# Patient Record
Sex: FEMALE | Race: OTHER | Employment: FULL TIME | URBAN - METROPOLITAN AREA
[De-identification: names, ages, dates, MRNs, and addresses within clinical notes are randomized per-mention and may not be internally consistent; named-entity substitution may affect disease eponyms.]

---

## 2017-02-07 NOTE — MR AVS SNAPSHOT
Forms for [employer] redone and then refaxed as requested.  Copy to scanning.   [facility] [place] [address] [address] [place] [id number] [phone]               Thank you for choosing us for your health care visit with Gallo Atkins MD.  We are glad to serve you and happy to provide you with this summary of y

## 2017-03-08 ENCOUNTER — OFFICE VISIT (OUTPATIENT)
Dept: OBGYN CLINIC | Facility: CLINIC | Age: 37
End: 2017-03-08

## 2017-03-08 VITALS
SYSTOLIC BLOOD PRESSURE: 105 MMHG | BODY MASS INDEX: 20.14 KG/M2 | HEIGHT: 64.25 IN | WEIGHT: 118 LBS | DIASTOLIC BLOOD PRESSURE: 70 MMHG | HEART RATE: 71 BPM

## 2017-03-08 DIAGNOSIS — Z01.419 WELL WOMAN EXAM WITH ROUTINE GYNECOLOGICAL EXAM: Primary | ICD-10-CM

## 2017-03-08 PROCEDURE — 99395 PREV VISIT EST AGE 18-39: CPT | Performed by: CLINICAL NURSE SPECIALIST

## 2017-03-09 NOTE — PROGRESS NOTES
Nicol Donaldson is a 39year old female  No LMP recorded. Patient is not currently having periods (Reason: IUD - Intrauterine Device). Patient presents with:  Gyn Exam: annual exam   Last annual exam and pap was 3/1/16. Pap was normal, HPV negative. Hypertension Mother    • Cancer Paternal Uncle      Colon cancer cause of death       MEDICATIONS:    Current outpatient prescriptions:   •  Cholecalciferol (VITAMIN D) 1000 UNITS Oral Tab, Take by mouth., Disp: , Rfl:     ALLERGIES:  No Known Allergies without tenderness  Adnexa: normal without masses or tenderness  Perineum: normal  Anus: small hemorroid     Assessment & Plan:  Enoch Vidales was seen today for gyn exam.    Diagnoses and all orders for this visit:    Well woman exam with routine gynecological

## 2017-05-11 ENCOUNTER — OFFICE VISIT (OUTPATIENT)
Dept: FAMILY MEDICINE CLINIC | Facility: CLINIC | Age: 37
End: 2017-05-11

## 2017-05-11 VITALS
HEIGHT: 65 IN | DIASTOLIC BLOOD PRESSURE: 81 MMHG | TEMPERATURE: 98 F | HEART RATE: 118 BPM | SYSTOLIC BLOOD PRESSURE: 116 MMHG | OXYGEN SATURATION: 96 % | BODY MASS INDEX: 19.49 KG/M2 | RESPIRATION RATE: 22 BRPM | WEIGHT: 117 LBS

## 2017-05-11 DIAGNOSIS — K52.9 GASTROENTERITIS: ICD-10-CM

## 2017-05-11 PROCEDURE — 99213 OFFICE O/P EST LOW 20 MIN: CPT | Performed by: FAMILY MEDICINE

## 2017-05-11 PROCEDURE — 99212 OFFICE O/P EST SF 10 MIN: CPT | Performed by: FAMILY MEDICINE

## 2017-05-11 RX ORDER — ONDANSETRON 4 MG/1
4 TABLET, FILM COATED ORAL EVERY 8 HOURS PRN
Qty: 20 TABLET | Refills: 0 | Status: SHIPPED | OUTPATIENT
Start: 2017-05-11 | End: 2017-05-18

## 2017-05-11 NOTE — PROGRESS NOTES
HPI:    Patient ID: Tabatha Canas is a 39year old female. HPI Comments: Pt has had vomiting/ diarrhea symptoms since last night. Has felt chills and feverish and some stomach aches. No suspicious ingestions or known sick contacts.  has had C.  D and no guarding. Lymphadenopathy:     She has no cervical adenopathy. ASSESSMENT/PLAN:   Gastroenteritis:  - After discussion, bland diet discussed; adequate fluids; zofran as needed for nausea; To call if worse or not better;  Follow up in 2

## 2017-12-05 ENCOUNTER — OFFICE VISIT (OUTPATIENT)
Dept: FAMILY MEDICINE CLINIC | Facility: CLINIC | Age: 37
End: 2017-12-05

## 2017-12-05 VITALS
HEART RATE: 67 BPM | BODY MASS INDEX: 21 KG/M2 | WEIGHT: 123.5 LBS | SYSTOLIC BLOOD PRESSURE: 115 MMHG | DIASTOLIC BLOOD PRESSURE: 78 MMHG

## 2017-12-05 DIAGNOSIS — R55 SYNCOPE, UNSPECIFIED SYNCOPE TYPE: Primary | ICD-10-CM

## 2017-12-05 DIAGNOSIS — R42 VERTIGO: ICD-10-CM

## 2017-12-05 PROCEDURE — 99214 OFFICE O/P EST MOD 30 MIN: CPT | Performed by: FAMILY MEDICINE

## 2017-12-05 PROCEDURE — 99212 OFFICE O/P EST SF 10 MIN: CPT | Performed by: FAMILY MEDICINE

## 2017-12-06 NOTE — PROGRESS NOTES
HPI:    Patient ID: Drake Jarrell is a 44year old female.     HPI  Patient presents with:  Fainting: pt fainted two weeks ago, since fainting episode she has been having dizziness off and on.  occurred at home, at night getting up from bed but minutes aft

## 2017-12-09 ENCOUNTER — APPOINTMENT (OUTPATIENT)
Dept: LAB | Facility: HOSPITAL | Age: 37
End: 2017-12-09
Attending: FAMILY MEDICINE
Payer: COMMERCIAL

## 2017-12-09 DIAGNOSIS — R55 SYNCOPE, UNSPECIFIED SYNCOPE TYPE: ICD-10-CM

## 2017-12-09 PROCEDURE — 82306 VITAMIN D 25 HYDROXY: CPT

## 2017-12-09 PROCEDURE — 36415 COLL VENOUS BLD VENIPUNCTURE: CPT

## 2017-12-09 PROCEDURE — 80048 BASIC METABOLIC PNL TOTAL CA: CPT

## 2017-12-09 PROCEDURE — 93010 ELECTROCARDIOGRAM REPORT: CPT | Performed by: FAMILY MEDICINE

## 2017-12-09 PROCEDURE — 85025 COMPLETE CBC W/AUTO DIFF WBC: CPT

## 2017-12-09 PROCEDURE — 93005 ELECTROCARDIOGRAM TRACING: CPT

## 2017-12-09 PROCEDURE — 84443 ASSAY THYROID STIM HORMONE: CPT

## 2017-12-12 ENCOUNTER — TELEPHONE (OUTPATIENT)
Dept: FAMILY MEDICINE CLINIC | Facility: CLINIC | Age: 37
End: 2017-12-12

## 2017-12-14 NOTE — TELEPHONE ENCOUNTER
Spoke with patient (identified name and ) ,results reviewed and agrees with  plan. Note      Normal ECG        Note      Very low vitamin D level. Should start otc vitamin D3 2000 IU daily. Take for at least 3 monhts.

## 2018-03-20 ENCOUNTER — OFFICE VISIT (OUTPATIENT)
Dept: OBGYN CLINIC | Facility: CLINIC | Age: 38
End: 2018-03-20

## 2018-03-20 VITALS
SYSTOLIC BLOOD PRESSURE: 116 MMHG | BODY MASS INDEX: 21 KG/M2 | HEART RATE: 69 BPM | WEIGHT: 123.5 LBS | DIASTOLIC BLOOD PRESSURE: 80 MMHG

## 2018-03-20 DIAGNOSIS — Z01.419 ENCOUNTER FOR GYNECOLOGICAL EXAMINATION: Primary | ICD-10-CM

## 2018-03-20 PROCEDURE — 99395 PREV VISIT EST AGE 18-39: CPT | Performed by: OBSTETRICS & GYNECOLOGY

## 2018-03-21 NOTE — PROGRESS NOTES
Chi Bhat is a 44year old female  No LMP recorded. Patient is not currently having periods (Reason: IUD - Intrauterine Device). here for annual exam.       Last seen 3/1/16. Last pap 3/2016 normal with neg HPV.       Had Mirena IUD placed 10/2 constipation  Genitourinary:  denies dysuria, incontinence, abnormal vaginal discharge, vaginal itching  Musculoskeletal:  denies back pain. Skin/Breast:  Denies any breast pain, lumps, or discharge.    Neurological:  denies headaches, extremity weakness o

## 2018-03-27 ENCOUNTER — TELEPHONE (OUTPATIENT)
Dept: FAMILY MEDICINE CLINIC | Facility: CLINIC | Age: 38
End: 2018-03-27

## 2018-03-27 DIAGNOSIS — Z00.00 ROUTINE GENERAL MEDICAL EXAMINATION AT A HEALTH CARE FACILITY: Primary | ICD-10-CM

## 2018-03-27 NOTE — TELEPHONE ENCOUNTER
Patient is calling to request general lab orders (will be setting up a px appointment for soon) and would also like to order a lab order for vitamin d (retest) please advise.

## 2018-03-30 ENCOUNTER — LAB ENCOUNTER (OUTPATIENT)
Dept: LAB | Facility: HOSPITAL | Age: 38
End: 2018-03-30
Attending: FAMILY MEDICINE
Payer: COMMERCIAL

## 2018-03-30 DIAGNOSIS — Z00.00 ROUTINE GENERAL MEDICAL EXAMINATION AT A HEALTH CARE FACILITY: ICD-10-CM

## 2018-03-30 PROCEDURE — 36415 COLL VENOUS BLD VENIPUNCTURE: CPT

## 2018-03-30 PROCEDURE — 82306 VITAMIN D 25 HYDROXY: CPT

## 2018-03-30 PROCEDURE — 80053 COMPREHEN METABOLIC PANEL: CPT

## 2018-03-30 PROCEDURE — 85025 COMPLETE CBC W/AUTO DIFF WBC: CPT

## 2018-03-30 PROCEDURE — 80061 LIPID PANEL: CPT

## 2018-04-17 ENCOUNTER — OFFICE VISIT (OUTPATIENT)
Dept: FAMILY MEDICINE CLINIC | Facility: CLINIC | Age: 38
End: 2018-04-17

## 2018-04-17 VITALS
BODY MASS INDEX: 20.66 KG/M2 | TEMPERATURE: 97 F | HEART RATE: 71 BPM | SYSTOLIC BLOOD PRESSURE: 107 MMHG | HEIGHT: 65 IN | DIASTOLIC BLOOD PRESSURE: 73 MMHG | WEIGHT: 124 LBS

## 2018-04-17 DIAGNOSIS — Z00.00 ROUTINE GENERAL MEDICAL EXAMINATION AT A HEALTH CARE FACILITY: Primary | ICD-10-CM

## 2018-04-17 DIAGNOSIS — M99.03 LUMBAR REGION SOMATIC DYSFUNCTION: ICD-10-CM

## 2018-04-17 DIAGNOSIS — K58.9 IRRITABLE BOWEL SYNDROME WITHOUT DIARRHEA: ICD-10-CM

## 2018-04-17 PROCEDURE — 99395 PREV VISIT EST AGE 18-39: CPT | Performed by: FAMILY MEDICINE

## 2018-04-17 RX ORDER — CHOLECALCIFEROL (VITAMIN D3) 50 MCG
TABLET ORAL
COMMUNITY

## 2018-04-18 NOTE — PROGRESS NOTES
HPI:    Patient ID: Kem Seaman is a 44year old female.     HPI  Patient presents with:  Routine Physical: annual physical, results of lab work, concerns with stomach issues, back pain     Past Medical History:   Diagnosis Date   • Abnormal Pap smear 20 normal and breath sounds normal.   Abdominal: There is no splenomegaly or hepatomegaly. There is no tenderness. There is no CVA tenderness. Musculoskeletal:        Lumbar back: Normal.   Lymphadenopathy:     She has no cervical adenopathy.    Neurological

## 2019-04-02 ENCOUNTER — OFFICE VISIT (OUTPATIENT)
Dept: OBGYN CLINIC | Facility: CLINIC | Age: 39
End: 2019-04-02
Payer: COMMERCIAL

## 2019-04-02 VITALS
BODY MASS INDEX: 21.17 KG/M2 | DIASTOLIC BLOOD PRESSURE: 72 MMHG | WEIGHT: 124 LBS | SYSTOLIC BLOOD PRESSURE: 110 MMHG | HEART RATE: 73 BPM | HEIGHT: 64.25 IN

## 2019-04-02 DIAGNOSIS — Z12.31 ENCOUNTER FOR SCREENING MAMMOGRAM FOR BREAST CANCER: ICD-10-CM

## 2019-04-02 DIAGNOSIS — Z01.419 ENCOUNTER FOR GYNECOLOGICAL EXAMINATION: Primary | ICD-10-CM

## 2019-04-02 DIAGNOSIS — Z12.4 SCREENING FOR MALIGNANT NEOPLASM OF CERVIX: ICD-10-CM

## 2019-04-02 PROCEDURE — 99395 PREV VISIT EST AGE 18-39: CPT | Performed by: OBSTETRICS & GYNECOLOGY

## 2019-04-03 NOTE — PROGRESS NOTES
Debbie Esparza is a 43year old female  No LMP recorded. Patient is not currently having periods (Reason: IUD - Intrauterine Device). here for annual exam.       Last seen 3/20/18. Last pap 3/2016 normal with neg HPV.     Had [address] IUD placed 10/20 pain or palpitations  Respiratory:  denies shortness of breath  Gastrointestinal:  denies heartburn, abdominal pain, diarrhea or constipation  Genitourinary:  denies dysuria, incontinence, abnormal vaginal discharge, vaginal itching  Musculoskeletal:  presley on Nexplanon. Will RTC 10/2019 for MIrena IUD removal.   RTC 1 year or prn    2. Encounter for screening mammogram for breast cancer   GARRETT TATA 2D+3D SCREENING BILAT (CPT=77067/85531);  Future        Requested Prescriptions      No prescriptions requested

## 2019-04-27 ENCOUNTER — NURSE TRIAGE (OUTPATIENT)
Dept: OTHER | Age: 39
End: 2019-04-27

## 2019-04-27 ENCOUNTER — OFFICE VISIT (OUTPATIENT)
Dept: FAMILY MEDICINE CLINIC | Facility: CLINIC | Age: 39
End: 2019-04-27
Payer: COMMERCIAL

## 2019-04-27 VITALS
HEART RATE: 99 BPM | TEMPERATURE: 99 F | SYSTOLIC BLOOD PRESSURE: 105 MMHG | DIASTOLIC BLOOD PRESSURE: 73 MMHG | BODY MASS INDEX: 21 KG/M2 | HEIGHT: 64.25 IN | WEIGHT: 123 LBS

## 2019-04-27 DIAGNOSIS — J01.00 ACUTE MAXILLARY SINUSITIS, RECURRENCE NOT SPECIFIED: Primary | ICD-10-CM

## 2019-04-27 PROCEDURE — 99213 OFFICE O/P EST LOW 20 MIN: CPT | Performed by: FAMILY MEDICINE

## 2019-04-27 PROCEDURE — 99212 OFFICE O/P EST SF 10 MIN: CPT | Performed by: FAMILY MEDICINE

## 2019-04-27 RX ORDER — AZITHROMYCIN 250 MG/1
TABLET, FILM COATED ORAL
Qty: 6 TABLET | Refills: 0 | Status: SHIPPED | OUTPATIENT
Start: 2019-04-27 | End: 2020-02-04 | Stop reason: ALTCHOICE

## 2019-04-27 NOTE — TELEPHONE ENCOUNTER
Patient called back, advised that Dr Shivani George not responded yet, patient asking to see different provider now, while checking   The schedule,found an open OV with Dr Shivani George and [address] made.        Future Appointments   Date Time Provider Drew Blandon   4/2

## 2019-04-27 NOTE — TELEPHONE ENCOUNTER
Please reply to pool: EM RN TRIAGE  Action Requested: Summary for Provider     []  Critical Lab, Recommendations Needed  [x] Need Additional Advice  []   FYI    [x]   Need Orders  [] Need Medications Sent to Pharmacy  []  Other     SUMMARY: Declines to see

## 2019-04-27 NOTE — PROGRESS NOTES
HPI:    Patient ID: Debbie Esparza is a 43year old female.     HPI  Patient presents with:  Headache: with pressure for 3 days, watery eyes,  runny nose, congestion  Fever: of 101, took tylonon at 10 pm yesterday   Back Pain: pain with certain movements, l

## 2019-05-05 ENCOUNTER — HOSPITAL ENCOUNTER (OUTPATIENT)
Dept: MAMMOGRAPHY | Facility: HOSPITAL | Age: 39
Discharge: HOME | End: 2019-05-05
Attending: OBSTETRICS & GYNECOLOGY
Payer: COMMERCIAL

## 2019-05-05 DIAGNOSIS — Z12.31 ENCOUNTER FOR SCREENING MAMMOGRAM FOR BREAST CANCER: ICD-10-CM

## 2019-05-05 PROCEDURE — 77063 BREAST TOMOSYNTHESIS BI: CPT | Performed by: OBSTETRICS & GYNECOLOGY

## 2019-05-05 PROCEDURE — 77067 SCR MAMMO BI INCL CAD: CPT | Performed by: OBSTETRICS & GYNECOLOGY

## 2019-07-31 ENCOUNTER — TELEPHONE (OUTPATIENT)
Dept: OBGYN CLINIC | Facility: CLINIC | Age: 39
End: 2019-07-31

## 2019-07-31 NOTE — TELEPHONE ENCOUNTER
PT REQUESTING APPT AFTER 5PM OR SAT. BOOKED APPT FOR 9-14-19 FOR MIRENA IUD REMOVAL AND RE-INSERTION PER PT REQUEST.

## 2019-09-14 ENCOUNTER — OFFICE VISIT (OUTPATIENT)
Dept: OBGYN CLINIC | Facility: CLINIC | Age: 39
End: 2019-09-14
Payer: COMMERCIAL

## 2019-09-14 VITALS
HEART RATE: 76 BPM | WEIGHT: 121.63 LBS | BODY MASS INDEX: 21 KG/M2 | DIASTOLIC BLOOD PRESSURE: 60 MMHG | SYSTOLIC BLOOD PRESSURE: 96 MMHG

## 2019-09-14 DIAGNOSIS — Z30.433 ENCOUNTER FOR REMOVAL AND REINSERTION OF INTRAUTERINE CONTRACEPTIVE DEVICE: Primary | ICD-10-CM

## 2019-09-14 PROCEDURE — 58300 INSERT INTRAUTERINE DEVICE: CPT | Performed by: OBSTETRICS & GYNECOLOGY

## 2019-09-14 PROCEDURE — 58301 REMOVE INTRAUTERINE DEVICE: CPT | Performed by: OBSTETRICS & GYNECOLOGY

## 2019-09-14 NOTE — PROCEDURES
IUD Removal     Consent signed. Procedure discussed with the patient in detail including indication, risks, benefits, alternatives and complications.     Pelvic Exam Findings:  Lesion description:  IUD strings seen from cervix    Procedure:  Speculum fe

## 2019-10-19 ENCOUNTER — OFFICE VISIT (OUTPATIENT)
Dept: OBGYN CLINIC | Facility: CLINIC | Age: 39
End: 2019-10-19
Payer: COMMERCIAL

## 2019-10-19 VITALS
HEART RATE: 91 BPM | BODY MASS INDEX: 21 KG/M2 | DIASTOLIC BLOOD PRESSURE: 69 MMHG | SYSTOLIC BLOOD PRESSURE: 101 MMHG | WEIGHT: 125.38 LBS

## 2019-10-19 DIAGNOSIS — Z30.431 IUD CHECK UP: Primary | ICD-10-CM

## 2019-10-19 PROCEDURE — 99212 OFFICE O/P EST SF 10 MIN: CPT | Performed by: OBSTETRICS & GYNECOLOGY

## 2019-10-19 NOTE — PROGRESS NOTES
Yvonne Billings is a 35year old female  No LMP recorded. (Menstrual status: IUD - Intrauterine Device). Patient presents with: Follow - Up:  Mirena IUD check     Here for IUD check. Had new Mirena IUD placed on 19.   Having irregular spotting Exam:  External Genitalia: normal appearance, hair distribution, and no lesions  Urethral Meatus:  normal in size, location, without lesions and prolapse  Bladder:  No fullness, masses or tenderness  Vagina:  Normal appearance without lesions, no abnormal

## 2019-11-30 ENCOUNTER — TELEPHONE (OUTPATIENT)
Dept: FAMILY MEDICINE CLINIC | Facility: CLINIC | Age: 39
End: 2019-11-30

## 2019-11-30 NOTE — TELEPHONE ENCOUNTER
Patient is requesting to speak with dr. Miller Manzo. She is wanting to see a dermatologists, and would like to know who he recommends.

## 2019-12-03 NOTE — TELEPHONE ENCOUNTER
Called spoke with  Pt in regards to information for derm, gave number and name of drs make appts with

## 2020-02-04 ENCOUNTER — OFFICE VISIT (OUTPATIENT)
Dept: FAMILY MEDICINE CLINIC | Facility: CLINIC | Age: 40
End: 2020-02-04
Payer: COMMERCIAL

## 2020-02-04 VITALS
TEMPERATURE: 98 F | WEIGHT: 127 LBS | BODY MASS INDEX: 21.68 KG/M2 | HEIGHT: 64.25 IN | DIASTOLIC BLOOD PRESSURE: 68 MMHG | HEART RATE: 75 BPM | SYSTOLIC BLOOD PRESSURE: 111 MMHG

## 2020-02-04 DIAGNOSIS — K58.9 IRRITABLE BOWEL SYNDROME WITHOUT DIARRHEA: ICD-10-CM

## 2020-02-04 DIAGNOSIS — Z00.00 ROUTINE GENERAL MEDICAL EXAMINATION AT A HEALTH CARE FACILITY: Primary | ICD-10-CM

## 2020-02-04 DIAGNOSIS — L60.3 NAIL DYSTROPHY: ICD-10-CM

## 2020-02-04 PROCEDURE — 99396 PREV VISIT EST AGE 40-64: CPT | Performed by: FAMILY MEDICINE

## 2020-02-04 NOTE — PROGRESS NOTES
HPI:    Patient ID: Jai Valdez is a 35year old female. HPI  Patient presents with:  Physical: annual physical (NEW INSURANCE)  IBS  Nail changes foot    Review of Systems   Constitutional: Negative. HENT: Negative. Eyes: Negative.     Respirat Lymphadenopathy:     She has no cervical adenopathy. Neurological: She is alert and oriented to person, place, and time. She has normal strength and normal reflexes. No sensory deficit.    Skin:   No suspicious lesions above the waist exam   Psychiatr

## 2020-02-09 ENCOUNTER — LAB ENCOUNTER (OUTPATIENT)
Dept: LAB | Facility: HOSPITAL | Age: 40
End: 2020-02-09
Attending: FAMILY MEDICINE
Payer: COMMERCIAL

## 2020-02-09 DIAGNOSIS — Z00.00 ROUTINE GENERAL MEDICAL EXAMINATION AT A HEALTH CARE FACILITY: ICD-10-CM

## 2020-02-09 LAB
ALBUMIN SERPL-MCNC: 4 G/DL (ref 3.4–5)
ALBUMIN/GLOB SERPL: 1.2 {RATIO} (ref 1–2)
ALP LIVER SERPL-CCNC: 65 U/L (ref 37–98)
ALT SERPL-CCNC: 16 U/L (ref 13–56)
ANION GAP SERPL CALC-SCNC: 4 MMOL/L (ref 0–18)
AST SERPL-CCNC: 12 U/L (ref 15–37)
BASOPHILS # BLD AUTO: 0.03 X10(3) UL (ref 0–0.2)
BASOPHILS NFR BLD AUTO: 0.7 %
BILIRUB SERPL-MCNC: 0.7 MG/DL (ref 0.1–2)
BUN BLD-MCNC: 16 MG/DL (ref 7–18)
BUN/CREAT SERPL: 20.5 (ref 10–20)
CALCIUM BLD-MCNC: 8.6 MG/DL (ref 8.5–10.1)
CHLORIDE SERPL-SCNC: 111 MMOL/L (ref 98–112)
CHOLEST SMN-MCNC: 126 MG/DL (ref ?–200)
CO2 SERPL-SCNC: 28 MMOL/L (ref 21–32)
CREAT BLD-MCNC: 0.78 MG/DL (ref 0.55–1.02)
DEPRECATED RDW RBC AUTO: 42.8 FL (ref 35.1–46.3)
EOSINOPHIL # BLD AUTO: 0.11 X10(3) UL (ref 0–0.7)
EOSINOPHIL NFR BLD AUTO: 2.7 %
ERYTHROCYTE [DISTWIDTH] IN BLOOD BY AUTOMATED COUNT: 12.4 % (ref 11–15)
GLOBULIN PLAS-MCNC: 3.3 G/DL (ref 2.8–4.4)
GLUCOSE BLD-MCNC: 75 MG/DL (ref 70–99)
HCT VFR BLD AUTO: 41 % (ref 35–48)
HDLC SERPL-MCNC: 57 MG/DL (ref 40–59)
HGB BLD-MCNC: 13.4 G/DL (ref 12–16)
IMM GRANULOCYTES # BLD AUTO: 0 X10(3) UL (ref 0–1)
IMM GRANULOCYTES NFR BLD: 0 %
LDLC SERPL CALC-MCNC: 54 MG/DL (ref ?–100)
LYMPHOCYTES # BLD AUTO: 1.43 X10(3) UL (ref 1–4)
LYMPHOCYTES NFR BLD AUTO: 34.5 %
M PROTEIN MFR SERPL ELPH: 7.3 G/DL (ref 6.4–8.2)
MCH RBC QN AUTO: 30.5 PG (ref 26–34)
MCHC RBC AUTO-ENTMCNC: 32.7 G/DL (ref 31–37)
MCV RBC AUTO: 93.4 FL (ref 80–100)
MONOCYTES # BLD AUTO: 0.3 X10(3) UL (ref 0.1–1)
MONOCYTES NFR BLD AUTO: 7.2 %
NEUTROPHILS # BLD AUTO: 2.27 X10 (3) UL (ref 1.5–7.7)
NEUTROPHILS # BLD AUTO: 2.27 X10(3) UL (ref 1.5–7.7)
NEUTROPHILS NFR BLD AUTO: 54.9 %
NONHDLC SERPL-MCNC: 69 MG/DL (ref ?–130)
OSMOLALITY SERPL CALC.SUM OF ELEC: 296 MOSM/KG (ref 275–295)
PATIENT FASTING Y/N/NP: YES
PATIENT FASTING Y/N/NP: YES
PLATELET # BLD AUTO: 234 10(3)UL (ref 150–450)
POTASSIUM SERPL-SCNC: 3.9 MMOL/L (ref 3.5–5.1)
RBC # BLD AUTO: 4.39 X10(6)UL (ref 3.8–5.3)
SODIUM SERPL-SCNC: 143 MMOL/L (ref 136–145)
TRIGL SERPL-MCNC: 76 MG/DL (ref 30–149)
VLDLC SERPL CALC-MCNC: 15 MG/DL (ref 0–30)
WBC # BLD AUTO: 4.1 X10(3) UL (ref 4–11)

## 2020-02-09 PROCEDURE — 36415 COLL VENOUS BLD VENIPUNCTURE: CPT

## 2020-02-09 PROCEDURE — 82306 VITAMIN D 25 HYDROXY: CPT

## 2020-02-09 PROCEDURE — 80061 LIPID PANEL: CPT

## 2020-02-09 PROCEDURE — 85025 COMPLETE CBC W/AUTO DIFF WBC: CPT

## 2020-02-09 PROCEDURE — 80053 COMPREHEN METABOLIC PANEL: CPT

## 2020-02-10 LAB — 25(OH)D3 SERPL-MCNC: 36.7 NG/ML (ref 30–100)

## 2020-02-25 ENCOUNTER — TELEPHONE (OUTPATIENT)
Dept: FAMILY MEDICINE CLINIC | Facility: CLINIC | Age: 40
End: 2020-02-25

## 2020-02-25 NOTE — TELEPHONE ENCOUNTER
Result Notes for CBC W/ DIFFERENTIAL     Notes recorded by Enoch Light DO on 2/24/2020 at 8:45 AM CST  Please call patient and inform that the laboratory results are acceptable range. The abnormalities are minor and not significant at this time.  lorena

## 2020-04-09 ENCOUNTER — TELEPHONE (OUTPATIENT)
Dept: FAMILY MEDICINE CLINIC | Facility: CLINIC | Age: 40
End: 2020-04-09

## 2020-04-09 NOTE — TELEPHONE ENCOUNTER
Jackelin from [address] [facility] would like the diagnosis on patient on why pt needed Vitamin D faxed to # [phone]. This order was on 2/09.  Please advise

## 2020-04-09 NOTE — TELEPHONE ENCOUNTER
Pt reported history of vitamin D insufficiency and takes supplement. and also she has IBS therefore it was ordered.

## 2020-04-10 ENCOUNTER — NURSE TRIAGE (OUTPATIENT)
Dept: OTHER | Age: 40
End: 2020-04-10

## 2020-04-10 DIAGNOSIS — H02.60 XANTHELASMA: Primary | ICD-10-CM

## 2020-04-10 PROCEDURE — 99213 OFFICE O/P EST LOW 20 MIN: CPT | Performed by: FAMILY MEDICINE

## 2020-04-10 NOTE — TELEPHONE ENCOUNTER
Action Requested: Summary for Provider     []  Critical Lab, Recommendations Needed  [x] Need Additional Advice  []   FYI    []   Need Orders  [] Need Medications Sent to Pharmacy  []  Other     SUMMARY: Patient states she that for past month or so  has a

## 2020-04-10 NOTE — TELEPHONE ENCOUNTER
Virtual Telephone Check-In    Yvonne Billings verbally consents to a Virtual/Telephone Check-In visit on 04/10/20. Patient understands and accepts financial responsibility for any deductible, co-insurance and/or co-pays associated with this service.     D

## 2020-04-24 ENCOUNTER — TELEPHONE (OUTPATIENT)
Dept: FAMILY MEDICINE CLINIC | Facility: CLINIC | Age: 40
End: 2020-04-24

## 2020-04-24 DIAGNOSIS — E55.9 VITAMIN D DEFICIENCY: Primary | ICD-10-CM

## 2020-04-24 NOTE — TELEPHONE ENCOUNTER
Mariama with [facility] & Patient called to advise Labs (blood work) done 02/092020 was coded/submitted/billed incorrectly. She did do routine labs, but One of the tests was entered in as Diagnosis/Routine: The Vitamin D Test is supposed to reflect it's for a Vitamin D Deficiency. This NEEDS to be resubmitted reflecting that so it is billed accordingly. Patient is very upset as this is not the first call about this (see communication from 4/9)    Please Advise ASAP.  Order needs to be changed/revised the claim for that test with the proper coding    [employer] Phone Call reference #: Henny Martin Number: [phone] (follow prompts for provider)    Patient is requesting a call from PCP once this is done and the claim is corrected

## 2020-04-27 NOTE — TELEPHONE ENCOUNTER
Clinical staff, please assist.   1. Print out lab order for Vitamin D 02/09/2020.     2. Dr. Maikel Pollock will need to cross out the diagnosis and write correct diagnosis, sign and date order. 3. Clinical staff please fax order with Cover sheet.  Attention Coding team [phone]

## 2020-05-05 ENCOUNTER — TELEPHONE (OUTPATIENT)
Dept: FAMILY MEDICINE CLINIC | Facility: CLINIC | Age: 40
End: 2020-05-05

## 2020-05-05 ENCOUNTER — MED REC SCAN ONLY (OUTPATIENT)
Dept: FAMILY MEDICINE CLINIC | Facility: CLINIC | Age: 40
End: 2020-05-05

## 2020-05-05 NOTE — TELEPHONE ENCOUNTER
Called pt to inform that corrected lab order was faxed to billing department. Fax # [phone]. Dr Geanie Cooks reviewed and fixed the billing codes for Vit D deficiency.

## 2020-05-05 NOTE — TELEPHONE ENCOUNTER
Corrected lab order verified and changed by Dr Arabella Foster faxed succesfully to billing department. Fax # [phone]. Confirmation number placed in scanning.

## 2020-06-01 ENCOUNTER — OFFICE VISIT (OUTPATIENT)
Dept: DERMATOLOGY CLINIC | Facility: CLINIC | Age: 40
End: 2020-06-01
Payer: COMMERCIAL

## 2020-06-01 DIAGNOSIS — B35.3 TINEA PEDIS OF BOTH FEET: ICD-10-CM

## 2020-06-01 DIAGNOSIS — D23.9 BENIGN NEOPLASM OF SKIN, UNSPECIFIED LOCATION: ICD-10-CM

## 2020-06-01 DIAGNOSIS — B35.1 ONYCHOMYCOSIS: Primary | ICD-10-CM

## 2020-06-01 DIAGNOSIS — D22.9 MULTIPLE NEVI: ICD-10-CM

## 2020-06-01 PROCEDURE — 99202 OFFICE O/P NEW SF 15 MIN: CPT | Performed by: DERMATOLOGY

## 2020-06-01 RX ORDER — TAVABOROLE TOPICAL SOLUTION, 5% 43.5 MG/ML
SOLUTION TOPICAL
Qty: 10 ML | Refills: 2 | Status: SHIPPED | OUTPATIENT
Start: 2020-06-01 | End: 2021-09-07

## 2020-06-01 RX ORDER — SULFAMETHOXAZOLE AND TRIMETHOPRIM 800; 160 MG/1; MG/1
TABLET ORAL
COMMUNITY
Start: 2020-04-09 | End: 2020-07-22

## 2020-06-01 RX ORDER — KETOCONAZOLE 20 MG/G
1 CREAM TOPICAL 2 TIMES DAILY
Qty: 30 G | Refills: 3 | Status: SHIPPED | OUTPATIENT
Start: 2020-06-01 | End: 2021-09-07

## 2020-06-03 ENCOUNTER — TELEPHONE (OUTPATIENT)
Dept: DERMATOLOGY CLINIC | Facility: CLINIC | Age: 40
End: 2020-06-03

## 2020-06-03 NOTE — TELEPHONE ENCOUNTER
S/w pt. Pt developed a blister on nose and chin s/p cryotherapy. Reviewed routine post cryo instructions. Pt encouraged to CB for questions/problems. Blisters are not painful, pt decided to leave it alone and let it resolve on its own.

## 2020-06-03 NOTE — TELEPHONE ENCOUNTER
Patient was seen on office Monday. Would like to speak to nurse in regards to the changes she is noticing on the procedure she got done to make sure is ok. Please advise.

## 2020-06-07 NOTE — PROGRESS NOTES
Mark Sanders is a 35year old female. HPI:     CC:  Patient presents with:  Derm Problem: New pt. pt presenting today with discoloration to bilatleral big toes for 8 months. Not currently using or taking any medications.   Lesion: pt. concerned about les History of blood transfusion    • Infertility, female    • Lump of breast, right 2005 and 2007   • Ovarian cyst 2013   • Pancreatitis 8 2014   • Varicella zoster 1988     Past Surgical History:   Procedure Laterality Date   • BREAST LUMPECTOMY  2005 and 20 Blood Transfusions: Not Asked        Caffeine Concern: No        Occupational Exposure: Not Asked        Hobby Hazards: Not Asked        Sleep Concern: Not Asked        Stress Concern: Not Asked        Weight Concern: Not Asked        Special Diet: Not Ask medications, allergies reviewed as noted. ROS:  Denies any other systemic complaints. No new or changeing lesions other than noted above. No fevers, chills, night sweats, unusual sun sensitivity. No other skin complaints.         History, medications out completely. Continue to trim affected nail plate. High likelihood of recurrence of onychomycosis discussed. Likely tinea pedis related to onychomycotic changes.     Dome-shaped flesh-colored 3 mm papules at right superior nasal sidewall, right ala sm

## 2020-07-02 ENCOUNTER — OFFICE VISIT (OUTPATIENT)
Dept: DERMATOLOGY CLINIC | Facility: CLINIC | Age: 40
End: 2020-07-02
Payer: COMMERCIAL

## 2020-07-02 DIAGNOSIS — D22.39 FIBROUS PAPULE OF NOSE: Primary | ICD-10-CM

## 2020-07-02 DIAGNOSIS — D23.9 BENIGN NEOPLASM OF SKIN, UNSPECIFIED LOCATION: ICD-10-CM

## 2020-07-02 DIAGNOSIS — D22.9 MULTIPLE NEVI: ICD-10-CM

## 2020-07-02 PROCEDURE — 99212 OFFICE O/P EST SF 10 MIN: CPT | Performed by: DERMATOLOGY

## 2020-07-06 NOTE — PROGRESS NOTES
Conor Bruner is a 38year old female. HPI:     CC:  Patient presents with:  Lesion: LOV 6/1/20. pt presenting today with lesion to R side of nose and chin that would like treated with cryo. pt states treated with cryo at last visit, with improvement. cyst 2013   • Pancreatitis 2014   • Varicella zoster      Past Surgical History:   Procedure Laterality Date   • BREAST LUMPECTOMY   and     R breast lump excised in  and    •      • OTHER SURGICAL HISTORY  2013    Cryotherapy. Hobby Hazards: Not Asked        Sleep Concern: Not Asked        Stress Concern: Not Asked        Weight Concern: Not Asked        Special Diet: Not Asked        Back Care: Not Asked        Exercise: Not Asked        Bike Helmet: Not Asked        Seat Belt: , arms, digits,palms. Multiple light to medium brown, well marginated, uniformly pigmented, macules and papules 6 mm and less scattered on exam. pigmented lesions examined with dermoscopy benign-appearing patterns.     See map today's date for lesions n for specific lesions. See additional diagnoses. Pros cons of various therapies, risks benefits discussed. Pathophysiology discussed with patient. Therapeutic options reviewed. See  Medications in grid. Instructions reviewed at length.     Benign nevi, s

## 2020-07-22 ENCOUNTER — OFFICE VISIT (OUTPATIENT)
Dept: OBGYN CLINIC | Facility: CLINIC | Age: 40
End: 2020-07-22
Payer: COMMERCIAL

## 2020-07-22 VITALS
WEIGHT: 119.81 LBS | SYSTOLIC BLOOD PRESSURE: 108 MMHG | DIASTOLIC BLOOD PRESSURE: 73 MMHG | HEART RATE: 69 BPM | BODY MASS INDEX: 20 KG/M2

## 2020-07-22 DIAGNOSIS — Z30.431 IUD CHECK UP: ICD-10-CM

## 2020-07-22 DIAGNOSIS — Z01.419 ENCOUNTER FOR GYNECOLOGICAL EXAMINATION WITHOUT ABNORMAL FINDING: Primary | ICD-10-CM

## 2020-07-22 DIAGNOSIS — Z12.31 VISIT FOR SCREENING MAMMOGRAM: ICD-10-CM

## 2020-07-22 DIAGNOSIS — Z11.3 SCREEN FOR STD (SEXUALLY TRANSMITTED DISEASE): ICD-10-CM

## 2020-07-22 PROCEDURE — 3078F DIAST BP <80 MM HG: CPT | Performed by: OBSTETRICS & GYNECOLOGY

## 2020-07-22 PROCEDURE — 99396 PREV VISIT EST AGE 40-64: CPT | Performed by: OBSTETRICS & GYNECOLOGY

## 2020-07-22 PROCEDURE — 3074F SYST BP LT 130 MM HG: CPT | Performed by: OBSTETRICS & GYNECOLOGY

## 2020-07-23 LAB
C TRACH DNA SPEC QL NAA+PROBE: NEGATIVE
N GONORRHOEA DNA SPEC QL NAA+PROBE: NEGATIVE

## 2020-07-24 LAB — T VAGINALIS RRNA SPEC QL NAA+PROBE: NEGATIVE

## 2020-07-26 NOTE — PROGRESS NOTES
Yue Hollingsworth is a 38year old female  No LMP recorded. (Menstrual status: IUD - Intrauterine Device). Patient presents with:  Gyn Exam: ANNUAL [address] pt -- last seen by me in  during pregn  Std Screen: wishes for office cultures only  . Social Needs      Financial resource strain: Not on file      Food insecurity:        Worry: Not on file        Inability: Not on file      Transportation needs:        Medical: Not on file        Non-medical: Not on file    Tobacco Use      Smoking status Onset   • Colon Cancer Paternal Uncle    • Thyroid disease Father    • Hypertension Mother    • Diabetes Maternal Aunt    • Heart Attack Neg    • Stroke Neg    • Breast Cancer Neg    • Ovarian Cancer Neg    • Uterine Cancer Neg    • Pancreatic Cancer Neg and situation.  Appropriate mood and affect    Pelvic Exam:  External Genitalia:  normal appearance, hair distribution, and no lesions  Urethral Meatus:   normal in size, location, without lesions and prolapse  Bladder:    no fullness, masses or tenderness

## 2020-08-30 ENCOUNTER — HOSPITAL ENCOUNTER (OUTPATIENT)
Dept: MAMMOGRAPHY | Facility: HOSPITAL | Age: 40
Discharge: HOME | End: 2020-08-30
Attending: OBSTETRICS & GYNECOLOGY
Payer: COMMERCIAL

## 2020-08-30 DIAGNOSIS — Z12.31 VISIT FOR SCREENING MAMMOGRAM: ICD-10-CM

## 2020-08-30 PROCEDURE — 77067 SCR MAMMO BI INCL CAD: CPT | Performed by: OBSTETRICS & GYNECOLOGY

## 2020-08-30 PROCEDURE — 77063 BREAST TOMOSYNTHESIS BI: CPT | Performed by: OBSTETRICS & GYNECOLOGY

## 2020-08-31 ENCOUNTER — MED REC SCAN ONLY (OUTPATIENT)
Dept: FAMILY MEDICINE CLINIC | Facility: CLINIC | Age: 40
End: 2020-08-31

## 2020-09-14 ENCOUNTER — OFFICE VISIT (OUTPATIENT)
Dept: FAMILY MEDICINE CLINIC | Facility: CLINIC | Age: 40
End: 2020-09-14
Payer: COMMERCIAL

## 2020-09-14 VITALS
SYSTOLIC BLOOD PRESSURE: 118 MMHG | BODY MASS INDEX: 20.83 KG/M2 | WEIGHT: 122 LBS | HEIGHT: 64.25 IN | TEMPERATURE: 98 F | DIASTOLIC BLOOD PRESSURE: 78 MMHG | HEART RATE: 69 BPM

## 2020-09-14 DIAGNOSIS — R42 VERTIGO: ICD-10-CM

## 2020-09-14 DIAGNOSIS — G44.209 TENSION HEADACHE: Primary | ICD-10-CM

## 2020-09-14 DIAGNOSIS — M99.01 CERVICAL SOMATIC DYSFUNCTION: ICD-10-CM

## 2020-09-14 DIAGNOSIS — R42 LIGHTHEADEDNESS: ICD-10-CM

## 2020-09-14 PROCEDURE — 99213 OFFICE O/P EST LOW 20 MIN: CPT | Performed by: FAMILY MEDICINE

## 2020-09-14 PROCEDURE — 3008F BODY MASS INDEX DOCD: CPT | Performed by: FAMILY MEDICINE

## 2020-09-14 PROCEDURE — 98925 OSTEOPATH MANJ 1-2 REGIONS: CPT | Performed by: FAMILY MEDICINE

## 2020-09-14 PROCEDURE — 3074F SYST BP LT 130 MM HG: CPT | Performed by: FAMILY MEDICINE

## 2020-09-14 PROCEDURE — 3078F DIAST BP <80 MM HG: CPT | Performed by: FAMILY MEDICINE

## 2020-09-14 RX ORDER — MECLIZINE HYDROCHLORIDE 25 MG/1
25 TABLET ORAL 3 TIMES DAILY PRN
Qty: 20 TABLET | Refills: 0 | Status: SHIPPED | OUTPATIENT
Start: 2020-09-14 | End: 2021-09-07

## 2020-09-14 NOTE — PROCEDURES
Pt was treated with OMT using soft tissue, myofacial release and muscle energy. Tolerated well and improved ROM.

## 2020-09-14 NOTE — PROGRESS NOTES
HPI:    Patient ID: Leroy Vigil is a 38year old female. HPI  Patient presents with:  Dizziness: started yestrday. Neck Pain  Headache  no injury. No nausea. No vision changes. Review of Systems   Constitutional: Negative.     Respiratory: Negat

## 2020-09-22 ENCOUNTER — OFFICE VISIT (OUTPATIENT)
Dept: FAMILY MEDICINE CLINIC | Facility: CLINIC | Age: 40
End: 2020-09-22
Payer: COMMERCIAL

## 2020-09-22 VITALS
WEIGHT: 122 LBS | HEIGHT: 64.5 IN | HEART RATE: 69 BPM | DIASTOLIC BLOOD PRESSURE: 67 MMHG | SYSTOLIC BLOOD PRESSURE: 102 MMHG | BODY MASS INDEX: 20.58 KG/M2 | TEMPERATURE: 98 F

## 2020-09-22 DIAGNOSIS — M99.04 SACRAL REGION SOMATIC DYSFUNCTION: ICD-10-CM

## 2020-09-22 DIAGNOSIS — G44.209 TENSION HEADACHE: ICD-10-CM

## 2020-09-22 DIAGNOSIS — M99.02 SOMATIC DYSFUNCTION OF THORACIC REGION: ICD-10-CM

## 2020-09-22 DIAGNOSIS — M99.01 CERVICAL SOMATIC DYSFUNCTION: Primary | ICD-10-CM

## 2020-09-22 PROCEDURE — 3074F SYST BP LT 130 MM HG: CPT | Performed by: FAMILY MEDICINE

## 2020-09-22 PROCEDURE — 3078F DIAST BP <80 MM HG: CPT | Performed by: FAMILY MEDICINE

## 2020-09-22 PROCEDURE — 90471 IMMUNIZATION ADMIN: CPT | Performed by: FAMILY MEDICINE

## 2020-09-22 PROCEDURE — 98926 OSTEOPATH MANJ 3-4 REGIONS: CPT | Performed by: FAMILY MEDICINE

## 2020-09-22 PROCEDURE — 3008F BODY MASS INDEX DOCD: CPT | Performed by: FAMILY MEDICINE

## 2020-09-22 PROCEDURE — 90686 IIV4 VACC NO PRSV 0.5 ML IM: CPT | Performed by: FAMILY MEDICINE

## 2020-09-22 PROCEDURE — 99213 OFFICE O/P EST LOW 20 MIN: CPT | Performed by: FAMILY MEDICINE

## 2020-09-22 NOTE — PROCEDURES
OMT performed on the left OA, right mid thoraic somatic dysfunction    OMT perfomed on the right lumbar and left SI somatic dysfunctions. Used soft tissue and muscle energy mostly. Tolerated well.

## 2020-09-22 NOTE — PROGRESS NOTES
HPI:    Patient ID: Yvonne Billings is a 38year old female. HPI  Patient presents with:  Neck Pain  Back Pain  tension headache. Positional vertigo less intense. No numbness or weakness present. Review of Systems   Constitutional: Negative.     Zullyu

## 2020-10-02 ENCOUNTER — OFFICE VISIT (OUTPATIENT)
Dept: FAMILY MEDICINE CLINIC | Facility: CLINIC | Age: 40
End: 2020-10-02
Payer: COMMERCIAL

## 2020-10-02 VITALS
HEART RATE: 67 BPM | DIASTOLIC BLOOD PRESSURE: 66 MMHG | TEMPERATURE: 98 F | HEIGHT: 64.5 IN | SYSTOLIC BLOOD PRESSURE: 100 MMHG | WEIGHT: 122 LBS | BODY MASS INDEX: 20.58 KG/M2

## 2020-10-02 DIAGNOSIS — M99.00 SOMATIC DYSFUNCTION OF HEAD REGION: ICD-10-CM

## 2020-10-02 DIAGNOSIS — G44.209 TENSION HEADACHE: Primary | ICD-10-CM

## 2020-10-02 DIAGNOSIS — M99.01 CERVICAL SOMATIC DYSFUNCTION: ICD-10-CM

## 2020-10-02 DIAGNOSIS — R51.9 SINUS HEADACHE: ICD-10-CM

## 2020-10-02 DIAGNOSIS — M99.02 SOMATIC DYSFUNCTION OF THORACIC REGION: ICD-10-CM

## 2020-10-02 PROCEDURE — 3008F BODY MASS INDEX DOCD: CPT | Performed by: FAMILY MEDICINE

## 2020-10-02 PROCEDURE — 3078F DIAST BP <80 MM HG: CPT | Performed by: FAMILY MEDICINE

## 2020-10-02 PROCEDURE — 98926 OSTEOPATH MANJ 3-4 REGIONS: CPT | Performed by: FAMILY MEDICINE

## 2020-10-02 PROCEDURE — 99213 OFFICE O/P EST LOW 20 MIN: CPT | Performed by: FAMILY MEDICINE

## 2020-10-02 PROCEDURE — 3074F SYST BP LT 130 MM HG: CPT | Performed by: FAMILY MEDICINE

## 2020-10-02 NOTE — PROGRESS NOTES
HPI:    Patient ID: Vale Genao is a 38year old female. HPI  Patient presents with:  Back Pain  Neck Pain  headache now back of the head and frontal area too. Review of Systems   Constitutional: Negative. HENT: Positive for sinus pain.     Muscul [id number]

## 2020-10-02 NOTE — PROCEDURES
Left anterior occiput treated with OMT myofacial release  cervothoracic and cervical area somatic dysfunction treated with myofacial release and muscle energy. Tolerate well and improvement detected.

## 2020-10-10 ENCOUNTER — OFFICE VISIT (OUTPATIENT)
Dept: FAMILY MEDICINE CLINIC | Facility: CLINIC | Age: 40
End: 2020-10-10
Payer: COMMERCIAL

## 2020-10-10 VITALS
TEMPERATURE: 98 F | HEIGHT: 64.75 IN | HEART RATE: 84 BPM | OXYGEN SATURATION: 98 % | WEIGHT: 122 LBS | SYSTOLIC BLOOD PRESSURE: 88 MMHG | BODY MASS INDEX: 20.58 KG/M2 | DIASTOLIC BLOOD PRESSURE: 64 MMHG

## 2020-10-10 DIAGNOSIS — M99.02 SOMATIC DYSFUNCTION OF THORACIC REGION: ICD-10-CM

## 2020-10-10 DIAGNOSIS — M99.00 SOMATIC DYSFUNCTION OF HEAD REGION: ICD-10-CM

## 2020-10-10 DIAGNOSIS — R51.9 SINUS HEADACHE: Primary | ICD-10-CM

## 2020-10-10 DIAGNOSIS — M99.04 SACRAL REGION SOMATIC DYSFUNCTION: ICD-10-CM

## 2020-10-10 PROCEDURE — 99213 OFFICE O/P EST LOW 20 MIN: CPT | Performed by: FAMILY MEDICINE

## 2020-10-10 PROCEDURE — 3078F DIAST BP <80 MM HG: CPT | Performed by: FAMILY MEDICINE

## 2020-10-10 PROCEDURE — 98926 OSTEOPATH MANJ 3-4 REGIONS: CPT | Performed by: FAMILY MEDICINE

## 2020-10-10 PROCEDURE — 3074F SYST BP LT 130 MM HG: CPT | Performed by: FAMILY MEDICINE

## 2020-10-10 PROCEDURE — 3008F BODY MASS INDEX DOCD: CPT | Performed by: FAMILY MEDICINE

## 2020-10-10 NOTE — PROGRESS NOTES
HPI:    Patient ID: Tomi Chino is a 38year old female. HPI  Return evaluation for headache and sinus pressure . Had some successful days without headache. Taking antihistamine. Tylenol seems to help.     Review of Systems   Constitutional: Negative MANIP,3-4 BODY REGN      Meds This Visit:  Requested Prescriptions      No prescriptions requested or ordered in this encounter       Imaging & Referrals:  None       IY#[id number]

## 2020-10-10 NOTE — PROCEDURES
Left anterior occiput treated with OMT myofacial release  cervothoracic and cervical area somatic dysfunction treated with myofacial release and muscle energy. Right SI treated with HVLA. Tolerate well and improvement detected.

## 2021-02-08 ENCOUNTER — OFFICE VISIT (OUTPATIENT)
Dept: FAMILY MEDICINE CLINIC | Facility: CLINIC | Age: 41
End: 2021-02-08
Payer: COMMERCIAL

## 2021-02-08 VITALS
HEIGHT: 64.75 IN | DIASTOLIC BLOOD PRESSURE: 70 MMHG | SYSTOLIC BLOOD PRESSURE: 105 MMHG | WEIGHT: 123 LBS | BODY MASS INDEX: 20.74 KG/M2 | HEART RATE: 75 BPM

## 2021-02-08 DIAGNOSIS — G44.209 TENSION HEADACHE: ICD-10-CM

## 2021-02-08 DIAGNOSIS — Z00.00 ROUTINE GENERAL MEDICAL EXAMINATION AT A HEALTH CARE FACILITY: Primary | ICD-10-CM

## 2021-02-08 PROCEDURE — 99396 PREV VISIT EST AGE 40-64: CPT | Performed by: FAMILY MEDICINE

## 2021-02-08 PROCEDURE — 3078F DIAST BP <80 MM HG: CPT | Performed by: FAMILY MEDICINE

## 2021-02-08 PROCEDURE — 3008F BODY MASS INDEX DOCD: CPT | Performed by: FAMILY MEDICINE

## 2021-02-08 PROCEDURE — 3074F SYST BP LT 130 MM HG: CPT | Performed by: FAMILY MEDICINE

## 2021-02-08 RX ORDER — NAPROXEN 500 MG/1
500 TABLET ORAL 2 TIMES DAILY PRN
Qty: 30 TABLET | Refills: 1 | Status: SHIPPED | OUTPATIENT
Start: 2021-02-08 | End: 2021-09-07

## 2021-02-08 NOTE — PROGRESS NOTES
HPI:    Patient ID: Lucas Hernández is a 38year old female.     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 The left eye shows no hemorrhage and no papilledema. The left eye shows no venous pulsations. Neck: Neck supple. No thyroid mass and no thyromegaly present. Cardiovascular: Normal heart sounds.    Pulses:       Radial pulses are 2+ on

## 2021-02-25 ENCOUNTER — TELEPHONE (OUTPATIENT)
Dept: FAMILY MEDICINE CLINIC | Facility: CLINIC | Age: 41
End: 2021-02-25

## 2021-02-25 NOTE — TELEPHONE ENCOUNTER
Please schedule a 30 minute consult with Dr. Shan De. Allergy environmental or food testing can be done at the consult visit as long as the patient is off antihistamines five days prior to the appointment.

## 2021-02-28 LAB
HEMATOCRIT: 40.2 % (ref 35–45)
HEMOGLOBIN: 13.5 G/DL (ref 11.7–15.5)
MCH: 30.5 PG (ref 27–33)
MCHC: 33.6 G/DL (ref 32–36)
MCV: 91 FL (ref 80–100)
RDW: 11.8 % (ref 11–15)
RED BLOOD CELL COUNT: 4.42 MILLION/UL (ref 3.8–5.1)
WHITE BLOOD CELL COUNT: 5.2 THOUSAND/UL (ref 3.8–10.8)

## 2021-03-08 ENCOUNTER — TELEPHONE (OUTPATIENT)
Dept: FAMILY MEDICINE CLINIC | Facility: CLINIC | Age: 41
End: 2021-03-08

## 2021-03-08 ENCOUNTER — OFFICE VISIT (OUTPATIENT)
Dept: DERMATOLOGY CLINIC | Facility: CLINIC | Age: 41
End: 2021-03-08
Payer: COMMERCIAL

## 2021-03-08 DIAGNOSIS — R23.4 OILY SKIN: ICD-10-CM

## 2021-03-08 DIAGNOSIS — L30.9 DERMATITIS: Primary | ICD-10-CM

## 2021-03-08 DIAGNOSIS — L70.0 ACNE VULGARIS: ICD-10-CM

## 2021-03-08 PROCEDURE — 99213 OFFICE O/P EST LOW 20 MIN: CPT | Performed by: DERMATOLOGY

## 2021-03-08 RX ORDER — KETOCONAZOLE 20 MG/ML
SHAMPOO TOPICAL
Qty: 120 ML | Refills: 12 | Status: SHIPPED | OUTPATIENT
Start: 2021-03-08

## 2021-03-08 RX ORDER — TRETINOIN 0.4 MG/G
1 GEL TOPICAL NIGHTLY
Qty: 20 G | Refills: 2 | Status: SHIPPED | OUTPATIENT
Start: 2021-03-08

## 2021-03-08 NOTE — TELEPHONE ENCOUNTER
Patient calling for blood test results from 2/27/2021. Blood work done at [facility]. Informed of message below.     Linsday Ac,    3/4/2021 10:34 PM CST       Please call patient and inform that the laboratory results are acceptable range and normal.

## 2021-03-08 NOTE — TELEPHONE ENCOUNTER
LOV 2/8/2021  Patient states she handed a physical copy of a form that needs to be filled out by pcp at [facility] for her employer. Please advise if form is complete.

## 2021-03-09 NOTE — TELEPHONE ENCOUNTER
Called pt LM in regards to calling clinic back in regards to message, need to ask questions, transfer call to radha        Question if pt had lab work done? [facility] to [facility]? If so when was lab work completed?  Need results to have f

## 2021-03-10 NOTE — TELEPHONE ENCOUNTER
Pt returned call back notified we do not have results, asked for date of lab draw pt stated 2/27/2021 at [facility]. Notified pt will call lab for results.  Will call pt back when forms are ready for       Called [facility] will be faxing lab results to [phone]

## 2021-03-11 NOTE — TELEPHONE ENCOUNTER
Called [facility] yesterday 3/10/2021 notified to fax results for pt.  Received results just for cbc only, notfied dr Godfrey Bolden on 3/11/2021 of results received, called pt to verifiy if other labs were drawn pt stated not sure of what other labs drawn or remembers

## 2021-03-19 NOTE — TELEPHONE ENCOUNTER
Called spoke with pt in regards to work [facility] [employer] screening form, notified [facility] did not do any other labs, dr Nina Schofield did sign form with having results put as 0 due to pt stating not going to [facility] to have those labs done, notiifed fax was successful, copy sent

## 2021-03-20 ENCOUNTER — TELEPHONE (OUTPATIENT)
Dept: DERMATOLOGY CLINIC | Facility: CLINIC | Age: 41
End: 2021-03-20

## 2021-03-20 NOTE — TELEPHONE ENCOUNTER
Can attempt pa--for acne. Since pt is over 38 this may not be covered at all due to age.   May need to use good rx or alt specialty pharmacyand or topical

## 2021-03-21 NOTE — PROGRESS NOTES
Chris Miranda is a 38year old female. HPI:     CC:  Patient presents with:  Derm Problem: LOV 7/2/20. pt presenting today with oily scalp and dandruff to front of head. Denies itching ot pain. pt has tried different OTC shampoos with slight improvement. 30 tablet 1   • Meclizine HCl 25 MG Oral Tab Take 1 tablet (25 mg total) by mouth 3 (three) times daily as needed. 20 tablet 0   • ketoconazole 2 % External Cream Apply 1 Application topically 2 (two) times daily. Apply to affected area 2 times daily.  [facility] on a farm: Not Asked        History of tanning: Not Asked        Outdoor occupation: Not Asked        Breast feeding: Not Asked        Reaction to local anesthetic: No    Social History Narrative      Not on file    Social Determinants of Health  Financial and new information noted in current visit. Patient with history of cautery of fibrous papules chin nasal dorsum. Have been fine. Has noted itching irritation on the face. Has small papules on the upper eyelids.   Consider dandruff and scaling on the f No obvious scaling. Dermatitis suspect seborrheic dermatitis as well as more environmental sensitivities.   Ketoconazole shampoo twice weekly to anterior scalp, consider adding topical steroid although given this location may cause more problems with acn likelihood of recurrence of onychomycosis discussed. Likely tinea pedis related to onychomycotic changes.     Facial lesions cauterized previously healed well no recurrence no new suspicious lesions in this area continue careful sun protection    Multiple

## 2021-03-22 ENCOUNTER — OFFICE VISIT (OUTPATIENT)
Dept: ALLERGY | Facility: CLINIC | Age: 41
End: 2021-03-22
Payer: COMMERCIAL

## 2021-03-22 ENCOUNTER — NURSE ONLY (OUTPATIENT)
Dept: ALLERGY | Facility: CLINIC | Age: 41
End: 2021-03-22
Payer: COMMERCIAL

## 2021-03-22 VITALS
DIASTOLIC BLOOD PRESSURE: 80 MMHG | SYSTOLIC BLOOD PRESSURE: 117 MMHG | OXYGEN SATURATION: 96 % | HEIGHT: 64.75 IN | WEIGHT: 123 LBS | HEART RATE: 78 BPM | BODY MASS INDEX: 20.74 KG/M2

## 2021-03-22 DIAGNOSIS — R42 VERTIGO: ICD-10-CM

## 2021-03-22 DIAGNOSIS — R51.9 SINUS HEADACHE: ICD-10-CM

## 2021-03-22 DIAGNOSIS — J30.89 PERENNIAL ALLERGIC RHINITIS: ICD-10-CM

## 2021-03-22 DIAGNOSIS — J30.89 PERENNIAL ALLERGIC RHINITIS: Primary | ICD-10-CM

## 2021-03-22 PROCEDURE — 95024 IQ TESTS W/ALLERGENIC XTRCS: CPT | Performed by: ALLERGY & IMMUNOLOGY

## 2021-03-22 PROCEDURE — 3079F DIAST BP 80-89 MM HG: CPT | Performed by: ALLERGY & IMMUNOLOGY

## 2021-03-22 PROCEDURE — 99204 OFFICE O/P NEW MOD 45 MIN: CPT | Performed by: ALLERGY & IMMUNOLOGY

## 2021-03-22 PROCEDURE — 3008F BODY MASS INDEX DOCD: CPT | Performed by: ALLERGY & IMMUNOLOGY

## 2021-03-22 PROCEDURE — 95004 PERQ TESTS W/ALRGNC XTRCS: CPT | Performed by: ALLERGY & IMMUNOLOGY

## 2021-03-22 PROCEDURE — 3074F SYST BP LT 130 MM HG: CPT | Performed by: ALLERGY & IMMUNOLOGY

## 2021-03-22 RX ORDER — LEVOCETIRIZINE DIHYDROCHLORIDE 5 MG/1
5 TABLET, FILM COATED ORAL NIGHTLY
Qty: 30 TABLET | Refills: 0 | Status: SHIPPED | OUTPATIENT
Start: 2021-03-22 | End: 2021-09-07

## 2021-03-22 RX ORDER — FLUTICASONE PROPIONATE 50 MCG
2 SPRAY, SUSPENSION (ML) NASAL DAILY
Qty: 1 BOTTLE | Refills: 0 | Status: SHIPPED | OUTPATIENT
Start: 2021-03-22 | End: 2021-04-19

## 2021-03-22 RX ORDER — CETIRIZINE HYDROCHLORIDE 10 MG/1
10 TABLET ORAL DAILY
COMMUNITY
End: 2021-09-07

## 2021-03-22 RX ORDER — LORATADINE 10 MG/1
10 TABLET ORAL DAILY
COMMUNITY
End: 2021-09-07

## 2021-03-22 NOTE — PATIENT INSTRUCTIONS
Recs:  See above skin testing to environmental allergens to screen for allergic triggers  Handouts on allergies and avoidance measures provided and reviewed including the potential treatment option of immunotherapy if specific environmental allergens are d

## 2021-03-22 NOTE — TELEPHONE ENCOUNTER
Patient calling and states form was rejected from her job  missing the date on there can we re-do it and fax back over.         Call her when it is faxed over please

## 2021-03-22 NOTE — PROGRESS NOTES
Leroy Vigil is a 38year old female.     HPI:   Patient presents with:  Sinus Problem: patient presents for sinus pressure headaches since October 2020,, has tried Claritin, Zyrtec with minimal relief     Patient is a 79-year-old female who presents for Smokeless tobacco: Never Used    Vaping Use      Vaping Use: Never used    Alcohol use: Yes      Alcohol/week: 0.0 standard drinks      Comment: occ.     Drug use: No      Comment: none       Medications (Active prior to today's visit):  Current Outpatient polyphagia  ENMT:  Negative for ear drainage, hearing loss.  See hpi   Eyes:  Negative for eye discharge and vision loss  Gastrointestinal:  Negative for abdominal pain, diarrhea and vomiting  Genitourinary:  Negative for dysuria and hematuria  Hema/Lymph: tinnitus    No history of asthma eczema or food allergies. Last vision screen was over a year ago.   Wears contacts and glasses    Skin testing today to common indoor and outdoor environmental allergens was + to dog     Patient with positive response to h potential side effects.  Teaching was provided via the teach back method

## 2021-03-23 NOTE — TELEPHONE ENCOUNTER
Called spoke with pt in regards to form, form was faxed on 3/22/2021 fax was succressful, notified pt

## 2021-03-24 RX ORDER — DESLORATADINE 5 MG/1
5 TABLET ORAL DAILY
Qty: 30 TABLET | Refills: 0 | Status: SHIPPED | OUTPATIENT
Start: 2021-03-24 | End: 2021-09-07

## 2021-03-24 NOTE — TELEPHONE ENCOUNTER
Please see message below. Would you like to try Clarinex with her or have her purchase Xyzal OTC? Thank you. Rx for Clarinex loaded.      Contact was initiated by [employer].

## 2021-03-24 NOTE — TELEPHONE ENCOUNTER
S/w pt and [facility], tretinoin gel 0.04% on back order, no release date.  They have 0.01% and 0.025% gel if reasonable, however pt is hesitant to use this, states she was instructed to use during the day, but with the warm weather and sun coming, she is afraid o

## 2021-03-24 NOTE — TELEPHONE ENCOUNTER
Patient called    Asking about medication, \[facility]\"" pharmacy sent several messages. Please call - does not know the name of medication.

## 2021-03-24 NOTE — TELEPHONE ENCOUNTER
May try Clarinex in place of Xyzal.  If not improving with Clarinex then consider Xyzal over-the-counter

## 2021-03-25 RX ORDER — LEVOCETIRIZINE DIHYDROCHLORIDE 5 MG/1
5 TABLET, FILM COATED ORAL EVERY EVENING
Qty: 90 TABLET | Refills: 1 | Status: SHIPPED | OUTPATIENT
Start: 2021-03-25 | End: 2021-05-17

## 2021-03-25 NOTE — TELEPHONE ENCOUNTER
RN contacted patient to notify her that Xyzal is not covered by insurance. Notified her that Dr. Marlon Dueñas has sent Desloratadine to her pharmacy. Advised that if that does not work, she may purchase Xyzal over the counter.

## 2021-03-25 NOTE — TELEPHONE ENCOUNTER
New fax from [facility] received stating that Desloratadine 5mg is now longer covered by insurance. Suggested alternative is Levocetirizine 5mg.     RN called pharmacy to clarify specifically if Levceotirzine will be covered (on 3/24/2020 [facility] had said insurance

## 2021-03-26 NOTE — TELEPHONE ENCOUNTER
These options are the generic gels --not the microgel as sent. They are not appropriate options. The do not have the same formulation. They breakdown with sun exposure and do not absorb the oil which is the purpose of the microgel.   Sunsensitivity is no

## 2021-03-30 RX ORDER — TRETINOIN 0.8 MG/G
1 GEL TOPICAL DAILY
Qty: 50 G | Refills: 12 | Status: SHIPPED | OUTPATIENT
Start: 2021-03-30 | End: 2021-09-07

## 2021-03-30 NOTE — TELEPHONE ENCOUNTER
Ok of this is covered or is an alternate pharmacy like [address] [place] [address] or [facility] [employer] [facility] reasonable/ less expensive? Will send the 0.08% micro   There may still be the coupons on line for this.  [facility]    If pt wants to try the generic tretinoin  gel s, would use

## 2021-03-30 NOTE — TELEPHONE ENCOUNTER
Pt informed of alternative rx. Pt instructed on accessing [facility] coupon. Pt able to find coupon online. Details regarding rx discussed with pt.

## 2021-04-13 ENCOUNTER — MED REC SCAN ONLY (OUTPATIENT)
Dept: FAMILY MEDICINE CLINIC | Facility: CLINIC | Age: 41
End: 2021-04-13

## 2021-04-19 RX ORDER — FLUTICASONE PROPIONATE 50 MCG
2 SPRAY, SUSPENSION (ML) NASAL DAILY
Qty: 1 BOTTLE | Refills: 0 | Status: SHIPPED | OUTPATIENT
Start: 2021-04-19 | End: 2021-05-04

## 2021-04-19 NOTE — TELEPHONE ENCOUNTER
Spoke with patient. Verified name and . Informed patient per Dr. Gillian Andrade, Flonase nasal spray has been refilled for 1 month and will need an appointment for further refills.  Patient verbalizes understanding and scheduled an appointment for 21 at [phone]

## 2021-04-19 NOTE — TELEPHONE ENCOUNTER
Patient last seen in Allergy 3/22/2021 for . . . Perennial allergic rhinitis  (primary encounter diagnosis)  Sinus headache  Vertigo     Refill request received for .  . .    Fluticasone Propionate (FLONASE) 50 MCG/ACT Nasal Suspension 1 Bottle 0 3/22/20

## 2021-05-04 RX ORDER — FLUTICASONE PROPIONATE 50 MCG
2 SPRAY, SUSPENSION (ML) NASAL DAILY
Qty: 1 BOTTLE | Refills: 0 | Status: SHIPPED | OUTPATIENT
Start: 2021-05-04 | End: 2021-09-07

## 2021-05-04 NOTE — TELEPHONE ENCOUNTER
Refill requested for: Fluticasone Nasal Spray    Last office visit: 3/22/2021    Previously advised to follow up in 2 to 4 weeks     F/U currently scheduled?  Yes, on 5/17/2021    Date of last refill: 4/19/2021    Medication loaded and pended in meds and or

## 2021-05-10 ENCOUNTER — TELEPHONE (OUTPATIENT)
Dept: DERMATOLOGY CLINIC | Facility: CLINIC | Age: 41
End: 2021-05-10

## 2021-05-10 ENCOUNTER — TELEPHONE (OUTPATIENT)
Dept: ALLERGY | Facility: CLINIC | Age: 41
End: 2021-05-10

## 2021-05-10 NOTE — TELEPHONE ENCOUNTER
RN called patient to go over her concerns. Patient reports that since seeing an eye doctor per Dr. Pineda Melendez recommendations that her symptoms have improved. She reports that her eye prescription changed drastically and reports feeling better.   Patient rep

## 2021-05-10 NOTE — TELEPHONE ENCOUNTER
Patient is requesting a call to discuss questions about her upcoming appointment. Patient declined to provide additional information.

## 2021-05-10 NOTE — TELEPHONE ENCOUNTER
LOV 3/8/21, pt has been using tretinoin QAM with good relief of acne but states she still has a lot of oily skin to her t-zone - wants to know if there's anything else she may try. Please advise.

## 2021-05-10 NOTE — TELEPHONE ENCOUNTER
Patient called    Asking to speak to RN regarding medication. Does not know the name of the medication right now.  Please call     LOV 3/8/21

## 2021-05-13 NOTE — TELEPHONE ENCOUNTER
There is not a prescription product. Cetaphil oily skin lotion can help absorb some of the oil.   There are some oil absorbing products with glycolic acid ( [employer] has one, I think Marvin Anguiano still has one too) and some of the Mattifying lotions that are

## 2021-05-17 ENCOUNTER — OFFICE VISIT (OUTPATIENT)
Dept: ALLERGY | Facility: CLINIC | Age: 41
End: 2021-05-17
Payer: COMMERCIAL

## 2021-05-17 VITALS
HEIGHT: 64.3 IN | HEART RATE: 70 BPM | TEMPERATURE: 98 F | OXYGEN SATURATION: 100 % | RESPIRATION RATE: 16 BRPM | SYSTOLIC BLOOD PRESSURE: 122 MMHG | BODY MASS INDEX: 21 KG/M2 | DIASTOLIC BLOOD PRESSURE: 83 MMHG

## 2021-05-17 DIAGNOSIS — J30.89 PERENNIAL ALLERGIC RHINITIS: Primary | ICD-10-CM

## 2021-05-17 DIAGNOSIS — R51.9 SINUS HEADACHE: ICD-10-CM

## 2021-05-17 PROCEDURE — 3079F DIAST BP 80-89 MM HG: CPT | Performed by: ALLERGY & IMMUNOLOGY

## 2021-05-17 PROCEDURE — 99214 OFFICE O/P EST MOD 30 MIN: CPT | Performed by: ALLERGY & IMMUNOLOGY

## 2021-05-17 PROCEDURE — 3074F SYST BP LT 130 MM HG: CPT | Performed by: ALLERGY & IMMUNOLOGY

## 2021-05-17 NOTE — PATIENT INSTRUCTIONS
Recs: Trial of Xyzal as needed rather than daily  May try off of Flonase in 2 weeks if not needing Xyzal  Reviewed allergic versus nonallergic rhinitis.   Reviewed prior testing being positive to dogs  Patient to contact me if worsening symptoms off medicat

## 2021-05-17 NOTE — PROGRESS NOTES
Jonnie Young is a 38year old female. HPI:   Patient presents with: Follow - Up: Follow up for allergies. Feeling good on medications last prescribed. She did go and get an eye exam as recommended and had her prescription changed.  Not having any dizz Medications (Active prior to today's visit):  Current Outpatient Medications   Medication Sig Dispense Refill   • Fluticasone Propionate 50 MCG/ACT Nasal Suspension 2 sprays by Nasal route daily.  1 Bottle 0   • Tretinoin Microsphere (RETIN-A MICRO PUMP irritability and lethargy  ENMT:  Negative for ear drainage, hearing loss and nasal drainage  Eyes:  Negative for eye discharge and vision loss  Gastrointestinal:  Negative for abdominal pain, diarrhea and vomiting  Integumentary:  Negative for pruritus an This Visit:  Requested Prescriptions      No prescriptions requested or ordered in this encounter       Imaging & Referrals:  None     5/17/2021  Angie Suarez MD    If medication samples were provided today, they were provided solely for patient educa

## 2021-06-08 ENCOUNTER — TELEPHONE (OUTPATIENT)
Dept: ALLERGY | Facility: CLINIC | Age: 41
End: 2021-06-08

## 2021-06-08 NOTE — TELEPHONE ENCOUNTER
Okay to trial Flonase as well with patient is well controlled and doing well.   May restart medication should she become symptomatic Patient states her pharmacy ,[facility], had faxed over a request for dosage and directions for the Lidocaine. [facility] has not gotten a response yet.    Marleny Alejandro on 1/6/2021 at 1:40 PM

## 2021-06-08 NOTE — TELEPHONE ENCOUNTER
SOFIYA napoles MD - patient stopped Flonase. LOV 5/17/21 advised \"Trial of Xyzal as needed rather than daily. May try off of Flonase in 2 weeks if not needing Xyzal.\"    Please advise further if needed.

## 2021-06-08 NOTE — TELEPHONE ENCOUNTER
Patient would like to inform the office that she will be stopping the medication below. Patient stated if her problem continues, she will let the clinic know.     Fluticasone Propionate 50 MCG/ACT Nasal Suspension 1 Bottle 0 2021    Si sprays by [place]

## 2021-09-07 ENCOUNTER — OFFICE VISIT (OUTPATIENT)
Dept: OBGYN CLINIC | Facility: CLINIC | Age: 41
End: 2021-09-07
Payer: COMMERCIAL

## 2021-09-07 VITALS
SYSTOLIC BLOOD PRESSURE: 95 MMHG | BODY MASS INDEX: 20 KG/M2 | WEIGHT: 120 LBS | DIASTOLIC BLOOD PRESSURE: 63 MMHG | HEART RATE: 84 BPM

## 2021-09-07 DIAGNOSIS — Z01.419 ENCOUNTER FOR GYNECOLOGICAL EXAMINATION: Primary | ICD-10-CM

## 2021-09-07 DIAGNOSIS — Z12.31 ENCOUNTER FOR SCREENING MAMMOGRAM FOR BREAST CANCER: ICD-10-CM

## 2021-09-07 PROCEDURE — 3074F SYST BP LT 130 MM HG: CPT | Performed by: OBSTETRICS & GYNECOLOGY

## 2021-09-07 PROCEDURE — 99396 PREV VISIT EST AGE 40-64: CPT | Performed by: OBSTETRICS & GYNECOLOGY

## 2021-09-07 PROCEDURE — 3078F DIAST BP <80 MM HG: CPT | Performed by: OBSTETRICS & GYNECOLOGY

## 2021-09-09 NOTE — PROGRESS NOTES
Tomi Chino is a 42year old female  No LMP recorded. (Menstrual status: IUD - Intrauterine Device). here for annual exam.       Last seen 10/19/2019. Last pap 2019 normal with neg HPV. Had Mirena IUD exchange on 2019.    Has irregular s (MULTIVITAMIN OR) Take by mouth. • Cholecalciferol (VITAMIN D) 2000 units Oral Tab Take by mouth.          ALLERGIES:  No Known Allergies      Review of Systems:  Constitutional:  Denies fatigue, night sweats, hot flashes  Eyes:  denies blurred or doubl normal      Assessment & Plan:    Glenis Arce is a 42year old female who presents for an annual physical exam.    1. Encounter for gynecological examination  Pap not done. ASCCP guidelines reviewed.    Encouraged annual exam.   Order for mammogram.   R

## 2021-10-24 ENCOUNTER — HOSPITAL ENCOUNTER (OUTPATIENT)
Dept: MAMMOGRAPHY | Facility: HOSPITAL | Age: 41
Discharge: HOME | End: 2021-10-24
Attending: OBSTETRICS & GYNECOLOGY
Payer: COMMERCIAL

## 2021-10-24 DIAGNOSIS — Z12.31 ENCOUNTER FOR SCREENING MAMMOGRAM FOR BREAST CANCER: ICD-10-CM

## 2021-10-24 PROCEDURE — 77063 BREAST TOMOSYNTHESIS BI: CPT | Performed by: OBSTETRICS & GYNECOLOGY

## 2021-10-24 PROCEDURE — 77067 SCR MAMMO BI INCL CAD: CPT | Performed by: OBSTETRICS & GYNECOLOGY

## 2021-11-04 ENCOUNTER — HOSPITAL ENCOUNTER (OUTPATIENT)
Dept: ULTRASOUND IMAGING | Facility: HOSPITAL | Age: 41
Discharge: HOME | End: 2021-11-04
Attending: OBSTETRICS & GYNECOLOGY
Payer: COMMERCIAL

## 2021-11-04 ENCOUNTER — HOSPITAL ENCOUNTER (OUTPATIENT)
Dept: MAMMOGRAPHY | Facility: HOSPITAL | Age: 41
Discharge: HOME | End: 2021-11-04
Attending: OBSTETRICS & GYNECOLOGY
Payer: COMMERCIAL

## 2021-11-04 DIAGNOSIS — R92.8 ABNORMAL MAMMOGRAM: ICD-10-CM

## 2021-11-04 PROCEDURE — 77061 BREAST TOMOSYNTHESIS UNI: CPT | Performed by: OBSTETRICS & GYNECOLOGY

## 2021-11-04 PROCEDURE — 77065 DX MAMMO INCL CAD UNI: CPT | Performed by: OBSTETRICS & GYNECOLOGY

## 2021-11-04 PROCEDURE — 76642 ULTRASOUND BREAST LIMITED: CPT | Performed by: OBSTETRICS & GYNECOLOGY

## 2021-11-22 ENCOUNTER — TELEPHONE (OUTPATIENT)
Dept: OBGYN CLINIC | Facility: CLINIC | Age: 41
End: 2021-11-22

## 2021-11-22 DIAGNOSIS — N60.01 BREAST CYST, RIGHT: Primary | ICD-10-CM

## 2021-11-22 NOTE — TELEPHONE ENCOUNTER
----- Message from Louis Vazquez MD sent at 11/5/2021  9:23 PM CDT -----  Additional views--  right breast cysts. 6 month f/u right mammogram with ultrasound.

## 2022-03-28 NOTE — TELEPHONE ENCOUNTER
FYI on message below I  will print out the order and put in your desk [address] [facility], [place]    Name: Emile Sandoval  YOB: 1954      Assessment/Plan:           Problem List Items Addressed This Visit        Cardiovascular and Mediastinum    Benign essential hypertension     controlled         Essential hypertension    PAD (peripheral artery disease) ([facility] )     Is an active smoker         Atrial fibrillation with RVR ([facility] )     Has good rate control with diltiazem and metoprolol  Using Eliquis as NOAC            Genitourinary    BRYANT (acute kidney injury) ([facility] )      She had previously normal kidney function but developed atrial fibrillation last fall  At that time her kidney function did decline to a creatinine of 1 1-1 2 (EGFR 45-50 mils per minute) I explained the meaning of creatinine and GFR to them and they understood  Will check urine protein studies and obtain a kidney ultrasound  I have reviewed her medications for potential nephrotoxins  This may be just on a hemodynamic basis  Bumex was held at this time which should help with fluid status  Other    Hyponatremia     She very  Likely has SIADH due to smoking, however, she has a very high dilute fluid intake  Will check urine sodium , osmolality urine creatinine and urine urea nitrogen to evaluate her sodium   She needs to limit fluids to 60 ounces a day She is at increased risk for falling due to low serum sodium           Other Visit Diagnoses     Stage 3a chronic kidney disease ([facility] )    -  Primary            Subjective:       Patient ID: Emile Sandoval is a 70 y o  female  referred by Dr Morro Chatterjee and Dr Saturnino Meléndez    HPI Has chronic kidney disease stage IIIA with a baseline creatinine of 1 1-1 2 mg/dL  She also has hyponatremia with a serum sodium level of 132 millimoles per L   she has a history of paroxysmal atrial fibrillation, hypertension, peripheral arterial disease, mild mitral stenosis and heart failure with preserved ejection fraction    She has a history of B-cell lymphoma and is an active smoker     she was treated with R  -CHOP at [facility] and completed the chemotherapy on April of 2019 with remission    Beverage history : 2 cups of coffee and 4-6 light beers daily --> 88 ounces of fluid a day    She broke her hip in November and had atrial fibrillation subsequently and then had a drop in kidney function to 45-50 ml/min  She does not take NSAIDs    The following portions of the patient's history were reviewed and updated as appropriate: allergies, current medications, past family history, past medical history, past social history, past surgical history and problem list     Review of Systems   Constitutional: Negative for fatigue  HENT: Negative for hearing loss  Eyes: Negative for visual disturbance  Respiratory: Negative for cough and shortness of breath  Cardiovascular: Positive for leg swelling  Negative for chest pain and palpitations  Leg swelling on standing   Gastrointestinal: Negative for abdominal pain, blood in stool, constipation and diarrhea  Genitourinary: Negative for decreased urine volume, difficulty urinating, dysuria, hematuria and urgency  Nocturia x 1   Musculoskeletal: Positive for arthralgias and gait problem  Neurological: Negative for dizziness, light-headedness and headaches  Hematological: Does not bruise/bleed easily  Psychiatric/Behavioral: Negative for dysphoric mood and sleep disturbance  Social History     Socioeconomic History    Marital status:      Spouse name: None    Number of children: None    Years of education: None    Highest education level: None   Occupational History    None   Tobacco Use    Smoking status: Current Some Day Smoker     Packs/day: 1 00     Years: 40 00     Pack years: 40 00     Types: Cigarettes    Smokeless tobacco: Never Used   Vaping Use    Vaping Use: Never used   Substance and Sexual Activity    Alcohol use: Yes    Drug use:  No Comment: medical marijuana 10/7/19    Sexual activity: None   Other Topics Concern    None   Social History Narrative    None     Social Determinants of Health     Financial Resource Strain: Not on file   Food Insecurity: Not on file   Transportation Needs: Not on file   Physical Activity: Not on file   Stress: Not on file   Social Connections: Not on file   Intimate Partner Violence: Not on file   Housing Stability: Not on file     Past Medical History:   Diagnosis Date    Bell's palsy     Cancer ([place] [address] )     lymphoma    Diffuse large B cell lymphoma ([place] [address] )     Hyperlipidemia     Hypertension     PAD (peripheral artery disease) ([place] [address] )     PAF (paroxysmal atrial fibrillation) ([place] [address] )     PVD (peripheral vascular disease) ([place] [address] )      Past Surgical History:   Procedure Laterality Date    ARTERIOGRAM Left 12/6/2018    Procedure: LEFT lower extremity angiography, with Left lower extremity run-off, stent and angioplasty of Left Common Femoral Artery (Left Brachial Access); Surgeon: Zainab Eldridge MD;  Location:  MAIN OR;  Service: Vascular    CARDIAC SURGERY      CARPAL TUNNEL RELEASE      CT NEEDLE BIOPSY LUNG  10/8/2019    HYSTERECTOMY      IR AORTAGRAM WITH RUN-OFF  12/6/2018    OOPHORECTOMY      OH OPEN RX FEMUR FX+INTRAMED LISA Left 9/29/2021    Procedure: INSERTION NAIL IM FEMUR ANTEGRADE (TROCHANTERIC);   Surgeon: Edi Michael DO;  Location: [address] [place] [address] MAIN OR;  Service: Orthopedics    TONSILLECTOMY         Current Outpatient Medications:     albuterol (2 5 mg/3 mL) 0 083 % nebulizer solution, Take 3 mL (2 5 mg total) by nebulization every 6 (six) hours as needed for wheezing, Disp: , Rfl: 0    ALPRAZolam (XANAX) 0 5 mg tablet, Take 0 25 mg by mouth, Disp: , Rfl:     apixaban (ELIQUIS) 5 mg, Take 1 tablet (5 mg total) by mouth 2 (two) times a day, Disp: 60 tablet, Rfl: 5    atorvastatin (LIPITOR) 40 mg tablet, Take 40 mg by mouth daily at bedtime  , Disp: , Rfl:     bumetanide (BUMEX) 0 5 MG tablet, Take 1 tablet (0 5 mg total) by mouth daily, Disp: 30 tablet, Rfl: 5    Cholecalciferol (VITAMIN D3) 2000 units TABS, Take 1 tablet by mouth daily, Disp: , Rfl:     [facility] NICOTINE TRANSDERMAL SYS 14 MG/24HR TD 24 hr patch, Place 1 patch on the skin every 24 hours  , Disp: , Rfl:     diltiazem (CARDIZEM CD) 240 mg 24 hr capsule, Take 1 capsule (240 mg total) by mouth daily, Disp: 30 capsule, Rfl: 5    folic acid (FOLVITE) 1 mg tablet, Take 1 tablet (1 mg total) by mouth daily, Disp: , Rfl: 0    gabapentin (NEURONTIN) 100 mg capsule, Take 1 capsule (100 mg total) by mouth daily at bedtime, Disp: , Rfl: 0    glycerin-hypromellose- (ARTIFICIAL TEARS) 0 2-0 2-1 % SOLN, 1 drop, Disp: , Rfl:     Melatonin ER (MELADOX) 3 MG TBCR, Take 1 tablet by mouth daily at bedtime, Disp: , Rfl:     metoprolol succinate (TOPROL-XL) 50 mg 24 hr tablet, Take 1 tablet (50 mg total) by mouth daily, Disp: 30 tablet, Rfl: 3    mirtazapine (REMERON) 15 mg tablet, Take 7 5 mg by mouth daily at bedtime  , Disp: , Rfl:     senna-docusate sodium (SENOKOT S) 8 6-50 mg per tablet, Take 1 tablet by mouth daily As needed for constipation, Disp: 20 tablet, Rfl: 0    silver sulfadiazine (SILVADENE,SSD) 1 % cream, Apply topically 2 (two) times a day Buttock wound, Disp: 50 g, Rfl: 0    thiamine (VITAMIN B1) 100 mg tablet, Take 1 tablet (100 mg total) by mouth daily, Disp: , Rfl: 0    magnesium oxide (MAG-OX) 400 mg, Take 1 tablet (400 mg total) by mouth 2 (two) times a day for 7 days, Disp: , Rfl: 0    Lab Results   Component Value Date    SODIUM 132 (L) 02/10/2022    K 4 4 02/10/2022     02/10/2022    CO2 26 02/10/2022    AGAP 6 02/10/2022    BUN 11 02/10/2022    CREATININE 1 22 02/10/2022    GLUC 90 02/10/2022    GLUF 90 02/06/2020    CALCIUM 9 8 02/10/2022    AST 19 12/30/2021    ALT 17 12/30/2021    ALKPHOS 131 (H) 12/30/2021    TP 6 4 12/30/2021    TBILI 0 44 12/30/2021    EGFR 45 02/10/2022     Lab Results Component Value Date    WBC 5 30 11/08/2021    HGB 12 9 11/08/2021    HCT 40 6 11/08/2021    MCV 92 11/08/2021     11/08/2021     Lab Results   Component Value Date    CHOLESTEROL 86 12/02/2018     Lab Results   Component Value Date    HDL 35 (L) 12/02/2018     Lab Results   Component Value Date    LDLCALC 37 12/02/2018     Lab Results   Component Value Date    TRIG 70 12/02/2018     No results found for: [place], [place]  Lab Results   Component Value Date    LMW9LMZDNBHR 4 830 10/06/2021     Lab Results   Component Value Date    CALCIUM 9 8 02/10/2022    PHOS 1 9 (L) 10/06/2021     No results found for: SPEP, UPEP  No results found for: LEONARDO SELLERS4HUR        Objective:      /68   Pulse 63   Wt 51 7 kg (114 lb)   SpO2 95%   BMI 20 85 kg/m²          Physical Exam  Vitals reviewed  Constitutional:       General: She is not in acute distress  Comments: Frail older appearing than stated age   HENT:      Right Ear: External ear normal  There is no impacted cerumen  Left Ear: External ear normal  There is no impacted cerumen  Eyes:      Extraocular Movements: Extraocular movements intact  Conjunctiva/sclera: Conjunctivae normal       Pupils: Pupils are equal, round, and reactive to light  Neck:      Vascular: No carotid bruit  Cardiovascular:      Rate and Rhythm: Normal rate and regular rhythm  Pulmonary:      Effort: Pulmonary effort is normal  No respiratory distress  Breath sounds: Normal breath sounds  No wheezing or rales  Abdominal:      General: Bowel sounds are normal  There is no distension  Palpations: Abdomen is soft  Tenderness: There is no abdominal tenderness  Musculoskeletal:         General: Normal range of motion  Cervical back: Normal range of motion  Right lower leg: No edema  Left lower leg: No edema  Lymphadenopathy:      Cervical: No cervical adenopathy  Skin:     General: Skin is warm and dry     Neurological: General: No focal deficit present  Mental Status: She is alert and oriented to person, place, and time  Mental status is at baseline  Gait: Gait normal       Deep Tendon Reflexes: Reflexes normal    Psychiatric:         Mood and Affect: Mood normal          Behavior: Behavior normal          Thought Content:  Thought content normal

## 2022-04-29 ENCOUNTER — HOSPITAL ENCOUNTER (OUTPATIENT)
Dept: MAMMOGRAPHY | Facility: HOSPITAL | Age: 42
Discharge: HOME | End: 2022-04-29
Attending: OBSTETRICS & GYNECOLOGY
Payer: COMMERCIAL

## 2022-04-29 ENCOUNTER — HOSPITAL ENCOUNTER (OUTPATIENT)
Dept: ULTRASOUND IMAGING | Facility: HOSPITAL | Age: 42
Discharge: HOME | End: 2022-04-29
Attending: OBSTETRICS & GYNECOLOGY
Payer: COMMERCIAL

## 2022-04-29 DIAGNOSIS — N60.01 BREAST CYST, RIGHT: ICD-10-CM

## 2022-04-29 PROCEDURE — 77061 BREAST TOMOSYNTHESIS UNI: CPT | Performed by: OBSTETRICS & GYNECOLOGY

## 2022-04-29 PROCEDURE — 77065 DX MAMMO INCL CAD UNI: CPT | Performed by: OBSTETRICS & GYNECOLOGY

## 2022-04-29 PROCEDURE — 76642 ULTRASOUND BREAST LIMITED: CPT | Performed by: OBSTETRICS & GYNECOLOGY

## 2022-04-30 ENCOUNTER — TELEPHONE (OUTPATIENT)
Dept: OBGYN CLINIC | Facility: CLINIC | Age: 42
End: 2022-04-30

## 2022-04-30 NOTE — PROGRESS NOTES
Right breast mammogram/ultrasound--  stable masses.    Needs diagnostic bilateral mammogram with right breast ultrasound in 6 months

## 2022-04-30 NOTE — TELEPHONE ENCOUNTER
----- Message from Jaci Juarez MD sent at 4/30/2022 11:32 AM CDT -----  Right breast mammogram/ultrasound--  stable masses.    Needs diagnostic bilateral mammogram with right breast ultrasound in 6 months

## 2022-05-28 ENCOUNTER — OFFICE VISIT (OUTPATIENT)
Dept: FAMILY MEDICINE CLINIC | Facility: CLINIC | Age: 42
End: 2022-05-28
Payer: COMMERCIAL

## 2022-05-28 ENCOUNTER — NURSE TRIAGE (OUTPATIENT)
Dept: FAMILY MEDICINE CLINIC | Facility: CLINIC | Age: 42
End: 2022-05-28

## 2022-05-28 VITALS
SYSTOLIC BLOOD PRESSURE: 114 MMHG | BODY MASS INDEX: 20.49 KG/M2 | HEIGHT: 64 IN | TEMPERATURE: 99 F | RESPIRATION RATE: 14 BRPM | HEART RATE: 79 BPM | WEIGHT: 120 LBS | OXYGEN SATURATION: 97 % | DIASTOLIC BLOOD PRESSURE: 69 MMHG

## 2022-05-28 DIAGNOSIS — J06.9 UPPER RESPIRATORY TRACT INFECTION, UNSPECIFIED TYPE: Primary | ICD-10-CM

## 2022-05-28 PROCEDURE — 3078F DIAST BP <80 MM HG: CPT | Performed by: NURSE PRACTITIONER

## 2022-05-28 PROCEDURE — 3074F SYST BP LT 130 MM HG: CPT | Performed by: NURSE PRACTITIONER

## 2022-05-28 PROCEDURE — 99202 OFFICE O/P NEW SF 15 MIN: CPT | Performed by: NURSE PRACTITIONER

## 2022-05-28 PROCEDURE — 3008F BODY MASS INDEX DOCD: CPT | Performed by: NURSE PRACTITIONER

## 2022-05-29 LAB — SARS-COV-2 RNA RESP QL NAA+PROBE: NOT DETECTED

## 2022-10-10 ENCOUNTER — TELEPHONE (OUTPATIENT)
Dept: FAMILY MEDICINE CLINIC | Facility: CLINIC | Age: 42
End: 2022-10-10

## 2022-10-10 DIAGNOSIS — Z00.00 ROUTINE GENERAL MEDICAL EXAMINATION AT A HEALTH CARE FACILITY: Primary | ICD-10-CM

## 2022-10-27 LAB
ABSOLUTE BASOPHILS: 41 CELLS/UL (ref 0–200)
ABSOLUTE EOSINOPHILS: 111 CELLS/UL (ref 15–500)
ABSOLUTE LYMPHOCYTES: 1447 CELLS/UL (ref 850–3900)
ABSOLUTE MONOCYTES: 242 CELLS/UL (ref 200–950)
ABSOLUTE NEUTROPHILS: 2259 CELLS/UL (ref 1500–7800)
ALBUMIN/GLOBULIN RATIO: 1.6 (CALC) (ref 1–2.5)
ALBUMIN: 4.5 G/DL (ref 3.6–5.1)
ALKALINE PHOSPHATASE: 62 U/L (ref 31–125)
ALT: 13 U/L (ref 6–29)
AST: 10 U/L (ref 10–30)
BASOPHILS: 1 %
BILIRUBIN, TOTAL: 1 MG/DL (ref 0.2–1.2)
BUN: 14 MG/DL (ref 7–25)
CALCIUM: 9.2 MG/DL (ref 8.6–10.2)
CARBON DIOXIDE: 28 MMOL/L (ref 20–32)
CHLORIDE: 107 MMOL/L (ref 98–110)
CHOL/HDLC RATIO: 2.3 (CALC)
CHOLESTEROL, TOTAL: 136 MG/DL
CREATININE: 0.7 MG/DL (ref 0.5–0.99)
EGFR: 110 ML/MIN/1.73M2
EOSINOPHILS: 2.7 %
GLOBULIN: 2.8 G/DL (CALC) (ref 1.9–3.7)
GLUCOSE: 75 MG/DL (ref 65–99)
HDL CHOLESTEROL: 60 MG/DL
HEMATOCRIT: 42.1 % (ref 35–45)
HEMOGLOBIN: 13.9 G/DL (ref 11.7–15.5)
LDL-CHOLESTEROL: 61 MG/DL (CALC)
LYMPHOCYTES: 35.3 %
MCH: 30 PG (ref 27–33)
MCHC: 33 G/DL (ref 32–36)
MCV: 90.9 FL (ref 80–100)
MONOCYTES: 5.9 %
MPV: 10.8 FL (ref 7.5–12.5)
NEUTROPHILS: 55.1 %
NON-HDL CHOLESTEROL: 76 MG/DL (CALC)
PLATELET COUNT: 234 THOUSAND/UL (ref 140–400)
POTASSIUM: 4 MMOL/L (ref 3.5–5.3)
PROTEIN, TOTAL: 7.3 G/DL (ref 6.1–8.1)
RDW: 12 % (ref 11–15)
RED BLOOD CELL COUNT: 4.63 MILLION/UL (ref 3.8–5.1)
SODIUM: 140 MMOL/L (ref 135–146)
TRIGLYCERIDES: 74 MG/DL
WHITE BLOOD CELL COUNT: 4.1 THOUSAND/UL (ref 3.8–10.8)

## 2022-11-03 ENCOUNTER — HOSPITAL ENCOUNTER (OUTPATIENT)
Dept: ULTRASOUND IMAGING | Facility: HOSPITAL | Age: 42
Discharge: HOME | End: 2022-11-03
Attending: OBSTETRICS & GYNECOLOGY
Payer: COMMERCIAL

## 2022-11-03 ENCOUNTER — HOSPITAL ENCOUNTER (OUTPATIENT)
Dept: MAMMOGRAPHY | Facility: HOSPITAL | Age: 42
Discharge: HOME | End: 2022-11-03
Attending: OBSTETRICS & GYNECOLOGY
Payer: COMMERCIAL

## 2022-11-03 DIAGNOSIS — R92.8 ABNORMAL MAMMOGRAM: ICD-10-CM

## 2022-11-03 PROCEDURE — 77066 DX MAMMO INCL CAD BI: CPT | Performed by: OBSTETRICS & GYNECOLOGY

## 2022-11-03 PROCEDURE — 77062 BREAST TOMOSYNTHESIS BI: CPT | Performed by: OBSTETRICS & GYNECOLOGY

## 2022-11-03 PROCEDURE — 76642 ULTRASOUND BREAST LIMITED: CPT | Performed by: OBSTETRICS & GYNECOLOGY

## 2022-11-07 ENCOUNTER — OFFICE VISIT (OUTPATIENT)
Dept: FAMILY MEDICINE CLINIC | Facility: CLINIC | Age: 42
End: 2022-11-07
Payer: COMMERCIAL

## 2022-11-07 VITALS
WEIGHT: 120 LBS | DIASTOLIC BLOOD PRESSURE: 76 MMHG | HEIGHT: 64.4 IN | HEART RATE: 79 BPM | BODY MASS INDEX: 20.24 KG/M2 | SYSTOLIC BLOOD PRESSURE: 110 MMHG

## 2022-11-07 DIAGNOSIS — R79.89 LOW SERUM VITAMIN D: ICD-10-CM

## 2022-11-07 DIAGNOSIS — Z00.00 ROUTINE GENERAL MEDICAL EXAMINATION AT A HEALTH CARE FACILITY: Primary | ICD-10-CM

## 2022-11-07 PROCEDURE — 3078F DIAST BP <80 MM HG: CPT | Performed by: FAMILY MEDICINE

## 2022-11-07 PROCEDURE — 99396 PREV VISIT EST AGE 40-64: CPT | Performed by: FAMILY MEDICINE

## 2022-11-07 PROCEDURE — 3008F BODY MASS INDEX DOCD: CPT | Performed by: FAMILY MEDICINE

## 2022-11-07 PROCEDURE — 3074F SYST BP LT 130 MM HG: CPT | Performed by: FAMILY MEDICINE

## 2022-11-15 ENCOUNTER — TELEPHONE (OUTPATIENT)
Dept: OBGYN CLINIC | Facility: CLINIC | Age: 42
End: 2022-11-15

## 2022-11-15 DIAGNOSIS — N64.9 BREAST LESION: Primary | ICD-10-CM

## 2022-11-16 NOTE — TELEPHONE ENCOUNTER
----- Message from Reyes Corn, MD sent at 11/11/2022  8:49 AM CST -----  Mammogram/ breast ultrasound-- stable lesions.   Needs right diagnostic mammogram with ultrasound in 6 months

## 2023-02-22 ENCOUNTER — OFFICE VISIT (OUTPATIENT)
Dept: OBGYN CLINIC | Facility: CLINIC | Age: 43
End: 2023-02-22

## 2023-02-22 VITALS
WEIGHT: 121.19 LBS | HEART RATE: 77 BPM | DIASTOLIC BLOOD PRESSURE: 72 MMHG | SYSTOLIC BLOOD PRESSURE: 106 MMHG | BODY MASS INDEX: 21 KG/M2

## 2023-02-22 DIAGNOSIS — Z01.419 WELL WOMAN EXAM WITH ROUTINE GYNECOLOGICAL EXAM: Primary | ICD-10-CM

## 2023-02-22 DIAGNOSIS — Z12.31 ENCOUNTER FOR SCREENING MAMMOGRAM FOR MALIGNANT NEOPLASM OF BREAST: ICD-10-CM

## 2023-02-22 PROCEDURE — 99396 PREV VISIT EST AGE 40-64: CPT | Performed by: OBSTETRICS & GYNECOLOGY

## 2023-02-22 PROCEDURE — 3074F SYST BP LT 130 MM HG: CPT | Performed by: OBSTETRICS & GYNECOLOGY

## 2023-02-22 PROCEDURE — 3078F DIAST BP <80 MM HG: CPT | Performed by: OBSTETRICS & GYNECOLOGY

## 2023-02-23 LAB — HPV I/H RISK 1 DNA SPEC QL NAA+PROBE: NEGATIVE

## 2023-04-15 ENCOUNTER — OFFICE VISIT (OUTPATIENT)
Dept: OBGYN CLINIC | Facility: CLINIC | Age: 43
End: 2023-04-15

## 2023-04-15 VITALS
WEIGHT: 119.81 LBS | SYSTOLIC BLOOD PRESSURE: 104 MMHG | DIASTOLIC BLOOD PRESSURE: 72 MMHG | BODY MASS INDEX: 20 KG/M2 | HEART RATE: 77 BPM

## 2023-04-15 DIAGNOSIS — L73.9 FOLLICULITIS: Primary | ICD-10-CM

## 2023-04-15 PROCEDURE — 3078F DIAST BP <80 MM HG: CPT | Performed by: NURSE PRACTITIONER

## 2023-04-15 PROCEDURE — 99213 OFFICE O/P EST LOW 20 MIN: CPT | Performed by: NURSE PRACTITIONER

## 2023-04-15 PROCEDURE — 3074F SYST BP LT 130 MM HG: CPT | Performed by: NURSE PRACTITIONER

## 2023-04-15 RX ORDER — CEPHALEXIN 500 MG/1
500 CAPSULE ORAL 2 TIMES DAILY
Qty: 14 CAPSULE | Refills: 0 | Status: SHIPPED | OUTPATIENT
Start: 2023-04-15

## 2023-05-05 ENCOUNTER — HOSPITAL ENCOUNTER (OUTPATIENT)
Dept: ULTRASOUND IMAGING | Facility: HOSPITAL | Age: 43
Discharge: HOME | End: 2023-05-05
Attending: OBSTETRICS & GYNECOLOGY
Payer: COMMERCIAL

## 2023-05-05 ENCOUNTER — HOSPITAL ENCOUNTER (OUTPATIENT)
Dept: MAMMOGRAPHY | Facility: HOSPITAL | Age: 43
Discharge: HOME | End: 2023-05-05
Attending: OBSTETRICS & GYNECOLOGY
Payer: COMMERCIAL

## 2023-05-05 DIAGNOSIS — N64.9 BREAST LESION: ICD-10-CM

## 2023-05-05 PROCEDURE — 77061 BREAST TOMOSYNTHESIS UNI: CPT | Performed by: OBSTETRICS & GYNECOLOGY

## 2023-05-05 PROCEDURE — 77065 DX MAMMO INCL CAD UNI: CPT | Performed by: OBSTETRICS & GYNECOLOGY

## 2023-05-05 PROCEDURE — 76642 ULTRASOUND BREAST LIMITED: CPT | Performed by: OBSTETRICS & GYNECOLOGY

## 2023-05-22 DIAGNOSIS — R92.8 CATEGORY 3 MAMMOGRAPHY RESULT WITH SHORT FOLLOW-UP INTERVAL SUGGESTED FOR PROBABLY BENIGN FINDING: Primary | ICD-10-CM

## 2023-08-03 ENCOUNTER — OFFICE VISIT (OUTPATIENT)
Dept: DERMATOLOGY CLINIC | Facility: CLINIC | Age: 43
End: 2023-08-03

## 2023-08-03 DIAGNOSIS — L30.9 DERMATITIS: Primary | ICD-10-CM

## 2023-08-03 PROCEDURE — 99213 OFFICE O/P EST LOW 20 MIN: CPT | Performed by: PHYSICIAN ASSISTANT

## 2023-08-03 NOTE — PROGRESS NOTES
HPI:    Patient ID: Jaz Anderson is a 39year old female. Patient presents with a itchy spot on right cheek for the past 2 weeks. Used neosporin with some relief. No draining or tenderness noted. No allergies to medications noted. Review of Systems   Constitutional:  Negative for chills and fever. Musculoskeletal:  Negative for arthralgias and myalgias. Skin:  Positive for rash. Negative for color change and wound. Current Outpatient Medications   Medication Sig Dispense Refill    mupirocin 2 % External Ointment Apply 1 Application topically 3 (three) times daily. 15 g 0    hydrocortisone 2.5 % External Ointment Apply 1 Application topically 2 (two) times daily. Use as needed 20 g 0    cephalexin 500 MG Oral Cap Take 1 capsule (500 mg total) by mouth 2 (two) times daily. 14 capsule 0    Multiple Vitamin (MULTIVITAMIN OR) Take by mouth. Cholecalciferol (VITAMIN D) 2000 units Oral Tab Take by mouth. Allergies:No Known Allergies   There were no vitals taken for this visit. There is no height or weight on file to calculate BMI. PHYSICAL EXAM:   Physical Exam  Constitutional:       General: She is not in acute distress. Appearance: Normal appearance. Skin:     General: Skin is warm and dry. Findings: Rash present. Comments: Erythematous area noted on the right cheek under the eye. No draining or tenderness noted. No scaling noted. Neurological:      Mental Status: She is alert and oriented to person, place, and time. ASSESSMENT/PLAN:   1. Dermatitis  -After discussion with patient, advised the following:  -Start mupirocin  -Educated to apply 2 times per day   -Start hydrocortisone  -Educated to apply 2 times per day  -To apply for 2 weeks  -Return if not improving.   -To call or follow-up with worsening symptoms or concerns.   -Pt was agreeable to plan and will comply with discussion above.          No orders of the defined types were placed in this encounter. Meds This Visit:  Requested Prescriptions     Signed Prescriptions Disp Refills    mupirocin 2 % External Ointment 15 g 0     Sig: Apply 1 Application topically 3 (three) times daily. hydrocortisone 2.5 % External Ointment 20 g 0     Sig: Apply 1 Application topically 2 (two) times daily.  Use as needed       Imaging & Referrals:  None         #[id number]

## 2023-11-04 ENCOUNTER — TELEPHONE (OUTPATIENT)
Dept: OBGYN CLINIC | Facility: CLINIC | Age: 43
End: 2023-11-04

## 2023-11-04 ENCOUNTER — LAB ENCOUNTER (OUTPATIENT)
Dept: LAB | Facility: HOSPITAL | Age: 43
End: 2023-11-04
Attending: NURSE PRACTITIONER
Payer: COMMERCIAL

## 2023-11-04 ENCOUNTER — OFFICE VISIT (OUTPATIENT)
Dept: OBGYN CLINIC | Facility: CLINIC | Age: 43
End: 2023-11-04

## 2023-11-04 VITALS
SYSTOLIC BLOOD PRESSURE: 109 MMHG | WEIGHT: 124.81 LBS | HEART RATE: 69 BPM | BODY MASS INDEX: 21 KG/M2 | DIASTOLIC BLOOD PRESSURE: 71 MMHG

## 2023-11-04 DIAGNOSIS — Z11.3 ROUTINE SCREENING FOR STI (SEXUALLY TRANSMITTED INFECTION): ICD-10-CM

## 2023-11-04 DIAGNOSIS — N90.89 VULVAR IRRITATION: Primary | ICD-10-CM

## 2023-11-04 LAB
HBV SURFACE AG SER-ACNC: <0.1 [IU]/L
HBV SURFACE AG SERPL QL IA: NONREACTIVE
HCV AB SERPL QL IA: NONREACTIVE

## 2023-11-04 PROCEDURE — 86803 HEPATITIS C AB TEST: CPT | Performed by: NURSE PRACTITIONER

## 2023-11-04 PROCEDURE — 87340 HEPATITIS B SURFACE AG IA: CPT | Performed by: NURSE PRACTITIONER

## 2023-11-04 PROCEDURE — 3078F DIAST BP <80 MM HG: CPT | Performed by: NURSE PRACTITIONER

## 2023-11-04 PROCEDURE — 86780 TREPONEMA PALLIDUM: CPT | Performed by: NURSE PRACTITIONER

## 2023-11-04 PROCEDURE — 3074F SYST BP LT 130 MM HG: CPT | Performed by: NURSE PRACTITIONER

## 2023-11-04 PROCEDURE — 99213 OFFICE O/P EST LOW 20 MIN: CPT | Performed by: NURSE PRACTITIONER

## 2023-11-04 PROCEDURE — 36415 COLL VENOUS BLD VENIPUNCTURE: CPT | Performed by: NURSE PRACTITIONER

## 2023-11-04 PROCEDURE — 87389 HIV-1 AG W/HIV-1&-2 AB AG IA: CPT | Performed by: NURSE PRACTITIONER

## 2023-11-04 NOTE — TELEPHONE ENCOUNTER
Pt was seen by [facility] on 4/15/23 for folliculitis, was treated with cephalexin and keflex. Pt states these medications did resolve symptoms. Pt reports a red bump on her vaginal area that she first noticed 2 weeks ago. Pt states this red bump has since resolved but she is now noting 2 additional smaller red bumps. Pt reports vaginal redness, itchiness and discomfort. Pt denies vaginal discharge or odor. Offered pt appt with [facility] today to be evaluated, pt accepts. Sent to [facility] as FYI.

## 2023-11-06 ENCOUNTER — TELEPHONE (OUTPATIENT)
Dept: OBGYN CLINIC | Facility: CLINIC | Age: 43
End: 2023-11-06

## 2023-11-06 LAB
C TRACH DNA SPEC QL NAA+PROBE: NEGATIVE
HSV1 DNA SPEC QL NAA+PROBE: NEGATIVE
HSV2 DNA SPEC QL NAA+PROBE: NEGATIVE
N GONORRHOEA DNA SPEC QL NAA+PROBE: NEGATIVE
T PALLIDUM AB SER QL: NEGATIVE
T VAGINALIS RRNA SPEC QL NAA+PROBE: NEGATIVE

## 2023-11-06 NOTE — TELEPHONE ENCOUNTER
Pt seen 11/4 for vaginal discomfort. Pt states irritation has increased. Labia are red, itchy. Ludmila discharge and odor. Pt using neosporin and sitz baths as directed at visit. She is requesting other recs for irritation, she states becoming very uncomfortable. To [facility] to advise. Pt requesting call between 12-1, otherwise after 2pm. She has meetings all morning. She is okay for VM.

## 2023-11-06 NOTE — TELEPHONE ENCOUNTER
Pt called, requesting appt today after 450pm. Pt informed we do not have any later providers in office. Offered  pm; pt declined. Pt again asking to see any provider after 450 pm, pt again informed we do not have any provider in the office pas 4 pm. Pt asking that [facility] call her. Pt states she is able to speak until 1 pm then will be in a meeting until 2pm. Pt informed with route to [facility]. To [facility] to please review and advise. Thank you.

## 2023-11-06 NOTE — TELEPHONE ENCOUNTER
Patient c/o increased itching and redness since last visit. Sitz baths help but then irritation returns, no change in appearance of skin, no lesions. Will try topical mycolog cream bid x 7 days. If no improvement needs follow up.

## 2023-11-09 ENCOUNTER — HOSPITAL ENCOUNTER (OUTPATIENT)
Dept: ULTRASOUND IMAGING | Facility: HOSPITAL | Age: 43
Discharge: HOME | End: 2023-11-09
Attending: OBSTETRICS & GYNECOLOGY
Payer: COMMERCIAL

## 2023-11-09 ENCOUNTER — HOSPITAL ENCOUNTER (OUTPATIENT)
Dept: MAMMOGRAPHY | Facility: HOSPITAL | Age: 43
Discharge: HOME | End: 2023-11-09
Attending: OBSTETRICS & GYNECOLOGY
Payer: COMMERCIAL

## 2023-11-09 DIAGNOSIS — R92.8 CATEGORY 3 MAMMOGRAPHY RESULT WITH SHORT FOLLOW-UP INTERVAL SUGGESTED FOR PROBABLY BENIGN FINDING: ICD-10-CM

## 2023-11-09 DIAGNOSIS — N64.9 BREAST LESION: ICD-10-CM

## 2023-11-09 PROCEDURE — 77062 BREAST TOMOSYNTHESIS BI: CPT | Performed by: OBSTETRICS & GYNECOLOGY

## 2023-11-09 PROCEDURE — 76642 ULTRASOUND BREAST LIMITED: CPT | Performed by: OBSTETRICS & GYNECOLOGY

## 2023-11-09 PROCEDURE — 77066 DX MAMMO INCL CAD BI: CPT | Performed by: OBSTETRICS & GYNECOLOGY

## 2023-11-14 ENCOUNTER — TELEPHONE (OUTPATIENT)
Dept: OBGYN CLINIC | Facility: CLINIC | Age: 43
End: 2023-11-14

## 2023-11-14 NOTE — TELEPHONE ENCOUNTER
----- Message from Adis Loyd MD sent at 11/14/2023  9:35 AM CST -----  Bilateral diagnostic mammogram--stable. Return to routine annual mammogram    Informed pt of results and recs per Abad Berry [id number]. Pt verbalized understanding.

## 2023-11-15 ENCOUNTER — TELEPHONE (OUTPATIENT)
Dept: OBGYN CLINIC | Facility: CLINIC | Age: 43
End: 2023-11-15

## 2023-11-15 NOTE — TELEPHONE ENCOUNTER
Minna Enciso    11/15/23  8:22 AM  Note  Pt calling for test results        Pt called and of Mammogram and STD testing. Pt states understanding.

## 2023-12-18 ENCOUNTER — OFFICE VISIT (OUTPATIENT)
Dept: DERMATOLOGY CLINIC | Facility: CLINIC | Age: 43
End: 2023-12-18

## 2023-12-18 DIAGNOSIS — B35.1 ONYCHOMYCOSIS: Primary | ICD-10-CM

## 2023-12-18 DIAGNOSIS — L21.9 SEBORRHEIC DERMATITIS: ICD-10-CM

## 2023-12-18 PROCEDURE — 99213 OFFICE O/P EST LOW 20 MIN: CPT | Performed by: PHYSICIAN ASSISTANT

## 2023-12-18 RX ORDER — KETOCONAZOLE 20 MG/ML
SHAMPOO TOPICAL
Qty: 120 ML | Refills: 3 | Status: SHIPPED | OUTPATIENT
Start: 2023-12-18

## 2023-12-18 RX ORDER — KETOCONAZOLE 20 MG/G
CREAM TOPICAL
Qty: 30 G | Refills: 1 | Status: SHIPPED | OUTPATIENT
Start: 2023-12-18

## 2023-12-18 RX ORDER — TAVABOROLE TOPICAL SOLUTION, 5% 43.5 MG/ML
1 SOLUTION TOPICAL DAILY
Qty: 10 ML | Refills: 3 | Status: SHIPPED | OUTPATIENT
Start: 2023-12-18

## 2023-12-19 ENCOUNTER — TELEPHONE (OUTPATIENT)
Dept: DERMATOLOGY CLINIC | Facility: CLINIC | Age: 43
End: 2023-12-19

## 2023-12-19 NOTE — TELEPHONE ENCOUNTER
Pharmacy requesting prior auth for   Tavaborole [facility]) 5 % External Solution, Apply 1 Application topically daily. , Disp: 10 mL, Rfl: 3    KEY: [id number]

## 2023-12-26 NOTE — TELEPHONE ENCOUNTER
Medication PA Requested:   tavaborole 5% sol                                                       CoverMyMeds Used:  Key:  Quantity: 10ml  Day Supply: 30  Sig: apply daily  DX Code:   B35.1                                  CPT code (if applicable):   Case Number/Pending Ref#:

## 2023-12-27 NOTE — TELEPHONE ENCOUNTER
PA completed through covermymeds.  [employer] is reviewing PA request. Typically an electronic response will be received within 24-72 hours

## 2024-03-06 ENCOUNTER — OFFICE VISIT (OUTPATIENT)
Dept: OBGYN CLINIC | Facility: CLINIC | Age: 44
End: 2024-03-06

## 2024-03-06 VITALS
WEIGHT: 123.38 LBS | HEART RATE: 75 BPM | DIASTOLIC BLOOD PRESSURE: 71 MMHG | SYSTOLIC BLOOD PRESSURE: 107 MMHG | BODY MASS INDEX: 21 KG/M2

## 2024-03-06 DIAGNOSIS — Z12.31 ENCOUNTER FOR SCREENING MAMMOGRAM FOR MALIGNANT NEOPLASM OF BREAST: ICD-10-CM

## 2024-03-06 DIAGNOSIS — Z01.419 WELL WOMAN EXAM WITH ROUTINE GYNECOLOGICAL EXAM: Primary | ICD-10-CM

## 2024-03-06 PROCEDURE — 3074F SYST BP LT 130 MM HG: CPT | Performed by: OBSTETRICS & GYNECOLOGY

## 2024-03-06 PROCEDURE — 3078F DIAST BP <80 MM HG: CPT | Performed by: OBSTETRICS & GYNECOLOGY

## 2024-03-06 PROCEDURE — 99396 PREV VISIT EST AGE 40-64: CPT | Performed by: OBSTETRICS & GYNECOLOGY

## 2024-03-06 NOTE — PROGRESS NOTES
Rosa Herrera is a 43 year old female  No LMP recorded. (Menstrual status: IUD - Intrauterine Device).   Chief Complaint   Patient presents with    Gyn Exam     Annual // Mammo order    Presenting for well woman exam. Last pap smear was normal 2023. Last mammogram was normal 2023. Mirena was placed 2019 with rare spotting.     OBSTETRICS HISTORY:  OB History    Para Term  AB Living   2 2 2 0 0 2   SAB IAB Ectopic Multiple Live Births   0 0 0 0 2       GYNE HISTORY:  No LMP recorded. (Menstrual status: IUD - Intrauterine Device).    History   Sexual Activity    Sexual activity: Yes    Birth control/ protection: Mirena        Menarche: 11  Use of Birth Control (if yes, specify type): Mirena  Pap Date: 23  Pap Result Notes: Neg Pap/HPV // Mammo 23 Diag C2- Benign  Follow Up Recommendation: Annual 23 AUTUMNN      MEDICAL HISTORY:  Past Medical History:   Diagnosis Date    Decorative tattoo     History of blood transfusion     with CSx    Infertility, female     ART    Ovarian cyst     Pancreatitis (HCC) 2014    never hospitalized    Varicella zoster          SURGICAL HISTORY:  Past Surgical History:   Procedure Laterality Date    BREAST LUMPECTOMY   and     R breast lump excised in  and           GARRETT LOCALIZATION WIRE 1 SITE RIGHT (CPT=19281)      20 years ago       SOCIAL HISTORY:  Social History     Socioeconomic History    Marital status:      Spouse name: Not on file    Number of children: Not on file    Years of education: Not on file    Highest education level: Not on file   Occupational History    Not on file   Tobacco Use    Smoking status: Never     Passive exposure: Never    Smokeless tobacco: Never   Vaping Use    Vaping Use: Never used   Substance and Sexual Activity    Alcohol use: Yes     Alcohol/week: 0.0 standard drinks of alcohol     Comment: occ.    Drug use: No     Comment: none    Sexual activity: Yes     Birth  control/protection: Mirena   Other Topics Concern     Service Not Asked    Blood Transfusions Not Asked    Caffeine Concern No    Occupational Exposure Not Asked    Hobby Hazards Not Asked    Sleep Concern Not Asked    Stress Concern Not Asked    Weight Concern Not Asked    Special Diet Not Asked    Back Care Not Asked    Exercise Not Asked    Bike Helmet Not Asked    Seat Belt Not Asked    Self-Exams Not Asked    Grew up on a farm Not Asked    History of tanning Not Asked    Outdoor occupation Not Asked    Breast feeding No    Reaction to local anesthetic No    Pt has a pacemaker No    Pt has a defibrillator No   Social History Narrative    Not on file     Social Determinants of Health     Financial Resource Strain: Not on file   Food Insecurity: Not on file   Transportation Needs: Not on file   Physical Activity: Not on file   Stress: Not on file   Social Connections: Not on file   Housing Stability: Not on file         Depression Screening (PHQ-2/PHQ-9): Over the LAST 2 WEEKS   Little interest or pleasure in doing things: Not at all    Feeling down, depressed, or hopeless: Not at all    PHQ-2 SCORE: 0           MEDICATIONS:    Current Outpatient Medications:     Ciclopirox 8 % External Solution, Apply 1 Application topically nightly., Disp: 6 mL, Rfl: 1    ketoconazole 2 % External Shampoo, Apply to scalp 2 times per week., Disp: 120 mL, Rfl: 3    ketoconazole 2 % External Cream, Apply to affected area 2 times daily around the toes for 6-8 weeks., Disp: 30 g, Rfl: 1    Multiple Vitamin (MULTIVITAMIN OR), Take by mouth., Disp: , Rfl:     Cholecalciferol (VITAMIN D) 2000 units Oral Tab, Take by mouth., Disp: , Rfl:     Tavaborole (KERYDIN) 5 % External Solution, Apply 1 Application topically daily. (Patient not taking: Reported on 3/6/2024), Disp: 10 mL, Rfl: 3    mupirocin 2 % External Ointment, Apply 1 Application topically 3 (three) times daily. (Patient not taking: Reported on 11/4/2023), Disp: 15 g,  Rfl: 0    hydrocortisone 2.5 % External Ointment, Apply 1 Application topically 2 (two) times daily. Use as needed (Patient not taking: Reported on 11/4/2023), Disp: 20 g, Rfl: 0    cephalexin 500 MG Oral Cap, Take 1 capsule (500 mg total) by mouth 2 (two) times daily. (Patient not taking: Reported on 11/4/2023), Disp: 14 capsule, Rfl: 0    ALLERGIES:  No Known Allergies      Review of Systems:  Review of Systems   All other systems reviewed and are negative.       Vitals:    03/06/24 1756   BP: 107/71   Pulse: 75       PHYSICAL EXAM:   Physical Exam  Vitals reviewed.   Constitutional:       Appearance: Normal appearance.   HENT:      Head: Atraumatic.   Eyes:      Pupils: Pupils are equal, round, and reactive to light.   Pulmonary:      Effort: Pulmonary effort is normal.   Chest:   Breasts:     Right: Normal. No bleeding, inverted nipple, mass, nipple discharge, skin change or tenderness.      Left: Normal. No bleeding, inverted nipple, mass, nipple discharge, skin change or tenderness.   Abdominal:      General: Abdomen is flat.      Palpations: Abdomen is soft.      Tenderness: There is no abdominal tenderness.   Genitourinary:     General: Normal vulva.      Exam position: Lithotomy position.      Labia:         Right: No rash, tenderness, lesion or injury.         Left: No rash, tenderness, lesion or injury.       Vagina: Normal.      Cervix: Normal.      Uterus: Normal. Not tender.       Adnexa: Right adnexa normal and left adnexa normal.        Right: No tenderness or fullness.          Left: No tenderness or fullness.     Lymphadenopathy:      Upper Body:      Right upper body: No supraclavicular, axillary or pectoral adenopathy.      Left upper body: No supraclavicular, axillary or pectoral adenopathy.   Skin:     General: Skin is warm and dry.   Neurological:      General: No focal deficit present.      Mental Status: She is alert and oriented to person, place, and time.   Psychiatric:         Mood and  Affect: Mood normal.         Behavior: Behavior normal.         Thought Content: Thought content normal.         Judgment: Judgment normal.           Assessment & Plan:  Rosa was seen today for gyn exam.    Diagnoses and all orders for this visit:    Well woman exam with routine gynecological exam    Encounter for screening mammogram for malignant neoplasm of breast  -     [facility] TATA 2D+3D SCREENING BILAT (CPT=77067/38038); Future        Requested Prescriptions      No prescriptions requested or ordered in this encounter       Next cotest due 2026 . ASSCP guidelines reviewed.  Annual exams encouraged. Call if any abnormal vaginal bleeding.  Mammogram order given.  SBE encouraged.

## 2024-11-13 ENCOUNTER — TELEPHONE (OUTPATIENT)
Dept: OBGYN CLINIC | Facility: CLINIC | Age: 44
End: 2024-11-13

## 2024-11-21 ENCOUNTER — TELEPHONE (OUTPATIENT)
Dept: FAMILY MEDICINE CLINIC | Facility: CLINIC | Age: 44
End: 2024-11-21

## 2024-11-21 NOTE — TELEPHONE ENCOUNTER
Patient called and said she  got her tetanus shot on 11/18 and would like to update it on her immunization records.

## 2024-11-21 NOTE — TELEPHONE ENCOUNTER
Updated Tdap vaccine per patients request. Patient advised to phone room that this was given at her job.

## 2024-11-23 ENCOUNTER — HOSPITAL ENCOUNTER (OUTPATIENT)
Dept: MAMMOGRAPHY | Facility: HOSPITAL | Age: 44
Discharge: HOME | End: 2024-11-23
Attending: OBSTETRICS & GYNECOLOGY
Payer: COMMERCIAL

## 2024-11-23 DIAGNOSIS — Z12.31 ENCOUNTER FOR SCREENING MAMMOGRAM FOR MALIGNANT NEOPLASM OF BREAST: ICD-10-CM

## 2024-11-23 PROCEDURE — 77067 SCR MAMMO BI INCL CAD: CPT | Performed by: OBSTETRICS & GYNECOLOGY

## 2024-11-23 PROCEDURE — 77063 BREAST TOMOSYNTHESIS BI: CPT | Performed by: OBSTETRICS & GYNECOLOGY

## 2025-02-05 ENCOUNTER — OFFICE VISIT (OUTPATIENT)
Dept: OBGYN CLINIC | Facility: CLINIC | Age: 45
End: 2025-02-05

## 2025-02-05 VITALS
SYSTOLIC BLOOD PRESSURE: 120 MMHG | WEIGHT: 127.19 LBS | BODY MASS INDEX: 22 KG/M2 | DIASTOLIC BLOOD PRESSURE: 82 MMHG | HEART RATE: 68 BPM

## 2025-02-05 DIAGNOSIS — Z12.31 ENCOUNTER FOR SCREENING MAMMOGRAM FOR MALIGNANT NEOPLASM OF BREAST: ICD-10-CM

## 2025-02-05 DIAGNOSIS — Z01.419 WELL WOMAN EXAM WITH ROUTINE GYNECOLOGICAL EXAM: Primary | ICD-10-CM

## 2025-02-05 PROCEDURE — 3074F SYST BP LT 130 MM HG: CPT | Performed by: OBSTETRICS & GYNECOLOGY

## 2025-02-05 PROCEDURE — 3079F DIAST BP 80-89 MM HG: CPT | Performed by: OBSTETRICS & GYNECOLOGY

## 2025-02-05 PROCEDURE — 99396 PREV VISIT EST AGE 40-64: CPT | Performed by: OBSTETRICS & GYNECOLOGY

## 2025-02-06 ENCOUNTER — TELEPHONE (OUTPATIENT)
Dept: OBGYN CLINIC | Facility: CLINIC | Age: 45
End: 2025-02-06

## 2025-02-06 RX ORDER — NYSTATIN AND TRIAMCINOLONE ACETONIDE 100000; 1 [USP'U]/G; MG/G
1 CREAM TOPICAL AS NEEDED
Qty: 30 G | Refills: 2 | Status: SHIPPED | OUTPATIENT
Start: 2025-02-06 | End: 2025-02-10

## 2025-02-06 NOTE — TELEPHONE ENCOUNTER
To Dr. Alcantar, patient was seen yesterday for annual and was to call with cream she needed refill. Medication is below. Please advise. Thank you

## 2025-02-06 NOTE — TELEPHONE ENCOUNTER
Patient calling was told to call sergo the medication she needs   Nystatin  Triancinolone   Acetonide   cream

## 2025-02-06 NOTE — PROGRESS NOTES
Rosa Herrera is a 44 year old female  No LMP recorded. (Menstrual status: IUD - Intrauterine Device).   Chief Complaint   Patient presents with    Gyn Exam     Annual // Reviewed Preventative/Wellness form with patient.     Presenting for well woman exam. Last pap smear was normal 2023. Has Mirena IUD with occasional spotting. Last mammogram was normal 2024.     OBSTETRICS HISTORY:  OB History    Para Term  AB Living   2 2 2 0 0 2   SAB IAB Ectopic Multiple Live Births   0 0 0 0 2       GYNE HISTORY:  No LMP recorded. (Menstrual status: IUD - Intrauterine Device).    History   Sexual Activity    Sexual activity: Yes    Birth control/ protection: Mirena        Menarche: 11  Use of Birth Control (if yes, specify type): Mirena  Pap Date: 23  Pap Result Notes: Neg Pap/HPV // Mammo 24 Diag C1-Neg  Follow Up Recommendation: Annual 3/6/24 AUTUMNN      MEDICAL HISTORY:  Past Medical History:    Decorative tattoo    History of blood transfusion    with CSx    Infertility, female    ART    Ovarian cyst    Pancreatitis (HCC)    never hospitalized    Varicella zoster         SURGICAL HISTORY:  Past Surgical History:   Procedure Laterality Date    Breast lumpectomy   and     R breast lump excised in ' and           Narayan localization wire 1 site right (cpt=19281)      20 years ago       SOCIAL HISTORY:  Social History     Socioeconomic History    Marital status:      Spouse name: Not on file    Number of children: Not on file    Years of education: Not on file    Highest education level: Not on file   Occupational History    Not on file   Tobacco Use    Smoking status: Never     Passive exposure: Never    Smokeless tobacco: Never   Vaping Use    Vaping status: Never Used   Substance and Sexual Activity    Alcohol use: Yes     Alcohol/week: 0.0 standard drinks of alcohol     Comment: occ.    Drug use: No     Comment: none    Sexual activity: Yes     Birth  control/protection: Mirena   Other Topics Concern     Service Not Asked    Blood Transfusions Not Asked    Caffeine Concern No    Occupational Exposure Not Asked    Hobby Hazards Not Asked    Sleep Concern Not Asked    Stress Concern Not Asked    Weight Concern Not Asked    Special Diet Not Asked    Back Care Not Asked    Exercise Not Asked    Bike Helmet Not Asked    Seat Belt Not Asked    Self-Exams Not Asked    Grew up on a farm Not Asked    History of tanning Not Asked    Outdoor occupation Not Asked    Breast feeding No    Reaction to local anesthetic No    Pt has a pacemaker No    Pt has a defibrillator No   Social History Narrative    Not on file     Social Drivers of Health     Food Insecurity: Not on file   Transportation Needs: Not on file   Stress: Not on file   Housing Stability: Not on file         Depression Screening (PHQ-2/PHQ-9): Over the LAST 2 WEEKS   Little interest or pleasure in doing things: Not at all    Feeling down, depressed, or hopeless: Not at all    PHQ-2 SCORE: 0           MEDICATIONS:    Current Outpatient Medications:     Ciclopirox 8 % External Solution, Apply 1 Application topically nightly., Disp: 6 mL, Rfl: 1    ketoconazole 2 % External Shampoo, Apply to scalp 2 times per week., Disp: 120 mL, Rfl: 3    ketoconazole 2 % External Cream, Apply to affected area 2 times daily around the toes for 6-8 weeks., Disp: 30 g, Rfl: 1    Multiple Vitamin (MULTIVITAMIN OR), Take by mouth., Disp: , Rfl:     Cholecalciferol (VITAMIN D) 2000 units Oral Tab, Take by mouth., Disp: , Rfl:     nystatin-triamcinolone 100,000-0.1 Units/g-% External Cream, Apply 1 Application topically as needed., Disp: 30 g, Rfl: 2    Tavaborole (KERYDIN) 5 % External Solution, Apply 1 Application topically daily. (Patient not taking: Reported on 2/5/2025), Disp: 10 mL, Rfl: 3    mupirocin 2 % External Ointment, Apply 1 Application topically 3 (three) times daily. (Patient not taking: Reported on 2/5/2025),  Disp: 15 g, Rfl: 0    hydrocortisone 2.5 % External Ointment, Apply 1 Application topically 2 (two) times daily. Use as needed (Patient not taking: Reported on 2/5/2025), Disp: 20 g, Rfl: 0    cephalexin 500 MG Oral Cap, Take 1 capsule (500 mg total) by mouth 2 (two) times daily. (Patient not taking: Reported on 2/5/2025), Disp: 14 capsule, Rfl: 0    ALLERGIES:  Allergies[1]      Review of Systems:  Review of Systems   All other systems reviewed and are negative.       Vitals:    02/05/25 1742   BP: 120/82   Pulse: 68       PHYSICAL EXAM:   Physical Exam  Vitals reviewed.   Constitutional:       Appearance: Normal appearance.   HENT:      Head: Atraumatic.   Eyes:      Pupils: Pupils are equal, round, and reactive to light.   Pulmonary:      Effort: Pulmonary effort is normal.   Chest:   Breasts:     Right: Normal. No bleeding, inverted nipple, mass, nipple discharge, skin change or tenderness.      Left: Normal. No bleeding, inverted nipple, mass, nipple discharge, skin change or tenderness.   Abdominal:      General: Abdomen is flat.      Palpations: Abdomen is soft.      Tenderness: There is no abdominal tenderness.   Genitourinary:     General: Normal vulva.      Exam position: Lithotomy position.      Labia:         Right: No rash, tenderness, lesion or injury.         Left: No rash, tenderness, lesion or injury.       Vagina: Normal.      Cervix: Normal.      Uterus: Normal. Not tender.       Adnexa: Right adnexa normal and left adnexa normal.        Right: No tenderness or fullness.          Left: No tenderness or fullness.        Comments: IUD strings visualized  Lymphadenopathy:      Upper Body:      Right upper body: No supraclavicular, axillary or pectoral adenopathy.      Left upper body: No supraclavicular, axillary or pectoral adenopathy.   Skin:     General: Skin is warm and dry.   Neurological:      General: No focal deficit present.      Mental Status: She is alert and oriented to person, place, and  time.   Psychiatric:         Mood and Affect: Mood normal.         Behavior: Behavior normal.         Thought Content: Thought content normal.         Judgment: Judgment normal.           Assessment & Plan:  Rosa was seen today for gyn exam.    Diagnoses and all orders for this visit:    Well woman exam with routine gynecological exam    Encounter for screening mammogram for malignant neoplasm of breast  -     [facility] TATA 2D+3D SCREENING BILAT (CPT=77067/32317); Future        Requested Prescriptions      No prescriptions requested or ordered in this encounter       Next cotest due 2026 . ASSCP guidelines reviewed.  Annual exams encouraged. Call if any abnormal vaginal bleeding.  Mammogram order given.  SBE encouraged.               [1] No Known Allergies

## 2025-02-10 RX ORDER — NYSTATIN 100000 U/G
1 CREAM TOPICAL AS NEEDED
Qty: 30 G | Refills: 2 | Status: SHIPPED | OUTPATIENT
Start: 2025-02-10

## 2025-02-10 RX ORDER — TRIAMCINOLONE ACETONIDE 1 MG/G
1 OINTMENT TOPICAL AS NEEDED
Qty: 30 G | Refills: 2 | Status: SHIPPED | OUTPATIENT
Start: 2025-02-10

## 2025-02-10 NOTE — TELEPHONE ENCOUNTER
Received notification from pharmacy that insurance does not cover mycolog. New prescription sent with separate medication.

## 2025-06-12 ENCOUNTER — OFFICE VISIT (OUTPATIENT)
Dept: DERMATOLOGY CLINIC | Facility: CLINIC | Age: 45
End: 2025-06-12

## 2025-06-12 DIAGNOSIS — B35.1 ONYCHOMYCOSIS: ICD-10-CM

## 2025-06-12 DIAGNOSIS — L21.9 SEBORRHEIC DERMATITIS: Primary | ICD-10-CM

## 2025-06-12 DIAGNOSIS — L98.8 AGE-RELATED FACIAL WRINKLES: ICD-10-CM

## 2025-06-12 PROCEDURE — 99213 OFFICE O/P EST LOW 20 MIN: CPT | Performed by: PHYSICIAN ASSISTANT

## 2025-06-12 RX ORDER — KETOCONAZOLE 20 MG/ML
SHAMPOO, SUSPENSION TOPICAL
Qty: 120 ML | Refills: 3 | Status: SHIPPED | OUTPATIENT
Start: 2025-06-12

## 2025-06-12 RX ORDER — TRETINOIN 0.25 MG/G
1 CREAM TOPICAL NIGHTLY
Qty: 30 G | Refills: 3 | Status: SHIPPED | OUTPATIENT
Start: 2025-06-12

## 2025-06-12 RX ORDER — CICLOPIROX 80 MG/ML
1 SOLUTION TOPICAL NIGHTLY
Qty: 6 ML | Refills: 1 | Status: SHIPPED | OUTPATIENT
Start: 2025-06-12

## 2025-06-12 NOTE — PROGRESS NOTES
HPI:    Patient ID: Rosa Herrera is a 45 year old female.    Patient presents to discuss skin care with emphasis on wrinkles.  Has noted wrinkles on her face and neck as well as dark circles under her eyes. She would also like a refill of her ketoconazole shampoo and ciclopirox nail polish. Feels scalp is improving with shampoo. Toenail improves with nailpolish as well.         Review of Systems   Constitutional:  Negative for chills and fever.   Musculoskeletal:  Negative for arthralgias and myalgias.   Skin:  Positive for rash. Negative for color change and wound.          Current Medications[1]  Allergies:Allergies[2]   There were no vitals taken for this visit.  There is no height or weight on file to calculate BMI.  PHYSICAL EXAM:   Physical Exam  Constitutional:       General: She is not in acute distress.     Appearance: Normal appearance.   Skin:     General: Skin is warm and dry.      Findings: Rash present.      Comments: Scaling noted on the scalp. No draining or tenderness noted. No erythema noted.     Wrinkles noted on the neck and around the mouth. Hyperpigmented areas noted under the eyes. No draining or tenderness noted. No erythema noted.       Toenails with slight discoloration ntoed.    Neurological:      Mental Status: She is alert and oriented to person, place, and time.                ASSESSMENT/PLAN:   1. Seborrheic dermatitis  -After discussion with patient, advised the following:  -Continue with ketoconazole shampoo  -Refilled  -Return in 1 year or sooner if flaring.   -To call or follow-up with worsening symptoms or concerns  -Patient was agreeable to plan and will comply with discussion above.       2. Onychomycosis  -After discussion with patient, advised the following:  -Continue with ciclopirox  -Educated to apply nightly.   -Return in 1 year.   -To call or follow-up with worsening symptoms or concerns  -Patient was agreeable to plan and will comply with discussion above.       3.  Age-related facial wrinkles  -After discussion with patient, advised the following:  -Start tretinoin  -Educated to apply nightly  -Start slowly with 2-3 tiems per week at night and then increase to nightly   -Will take a few months of consistent use to notice improvement.   -To call or follow-up with worsening symptoms or concerns  -Patient was agreeable to plan and will comply with discussion above.         No orders of the defined types were placed in this encounter.      Meds This Visit:  Requested Prescriptions     Signed Prescriptions Disp Refills    tretinoin 0.025 % External Cream 30 g 3     Sig: Apply 1 Application topically nightly. Use as tolerated    ketoconazole 2 % External Shampoo 120 mL 3     Sig: Apply to scalp 2 times per week.    Ciclopirox 8 % External Solution 6 mL 1     Sig: Apply 1 Application topically nightly.       Imaging & Referrals:  None         ID#[id number]       [1]   Current Outpatient Medications   Medication Sig Dispense Refill    tretinoin 0.025 % External Cream Apply 1 Application topically nightly. Use as tolerated 30 g 3    ketoconazole 2 % External Shampoo Apply to scalp 2 times per week. 120 mL 3    Ciclopirox 8 % External Solution Apply 1 Application topically nightly. 6 mL 1    clotrimazole-betamethasone 1-0.05 % External Cream Apply 1 each topically as needed. 45 g 2    ketoconazole 2 % External Cream Apply to affected area 2 times daily around the toes for 6-8 weeks. 30 g 1    Multiple Vitamin (MULTIVITAMIN OR) Take by mouth.      Cholecalciferol (VITAMIN D) 2000 units Oral Tab Take by mouth.      Tavaborole (KERYDIN) 5 % External Solution Apply 1 Application topically daily. (Patient not taking: Reported on 6/12/2025) 10 mL 3    mupirocin 2 % External Ointment Apply 1 Application topically 3 (three) times daily. (Patient not taking: Reported on 6/12/2025) 15 g 0    hydrocortisone 2.5 % External Ointment Apply 1 Application topically 2 (two) times daily. Use as needed (Patient  not taking: Reported on 6/12/2025) 20 g 0    cephalexin 500 MG Oral Cap Take 1 capsule (500 mg total) by mouth 2 (two) times daily. (Patient not taking: Reported on 6/12/2025) 14 capsule 0   [2] No Known Allergies

## 2025-06-16 ENCOUNTER — TELEPHONE (OUTPATIENT)
Dept: DERMATOLOGY CLINIC | Facility: CLINIC | Age: 45
End: 2025-06-16

## 2025-06-16 NOTE — TELEPHONE ENCOUNTER
Medication PA Requested:     tretinoin     0.025% cream                                                 [employer] Used:  Key:  Quantity: 30gm  Day Supply: 30  Sig: apply QHS  DX Code:  L98.8                                   CPT code (if applicable):   Case Number/Pending Ref#:

## 2025-06-16 NOTE — TELEPHONE ENCOUNTER
Fax from [employer]    placed fax in PA inbox    PA required for: Tretinoin       Key [id number]

## 2025-07-07 ENCOUNTER — OFFICE VISIT (OUTPATIENT)
Dept: FAMILY MEDICINE CLINIC | Facility: CLINIC | Age: 45
End: 2025-07-07

## 2025-07-07 VITALS
HEART RATE: 68 BPM | OXYGEN SATURATION: 98 % | BODY MASS INDEX: 22.36 KG/M2 | WEIGHT: 131 LBS | RESPIRATION RATE: 18 BRPM | TEMPERATURE: 98 F | HEIGHT: 64 IN | SYSTOLIC BLOOD PRESSURE: 98 MMHG | DIASTOLIC BLOOD PRESSURE: 62 MMHG

## 2025-07-07 DIAGNOSIS — M67.441 GANGLION CYST OF JOINT OF FINGER OF RIGHT HAND: Primary | ICD-10-CM

## 2025-07-07 PROCEDURE — 3078F DIAST BP <80 MM HG: CPT | Performed by: FAMILY MEDICINE

## 2025-07-07 PROCEDURE — 3074F SYST BP LT 130 MM HG: CPT | Performed by: FAMILY MEDICINE

## 2025-07-07 PROCEDURE — 99213 OFFICE O/P EST LOW 20 MIN: CPT | Performed by: FAMILY MEDICINE

## 2025-07-07 PROCEDURE — 3008F BODY MASS INDEX DOCD: CPT | Performed by: FAMILY MEDICINE

## 2025-07-08 NOTE — PROGRESS NOTES
Subjective:   Rosa Herrera is a 45 year old female who presents for Fatigue (Bump on right wrist about a month ago ) and Menopause       History/Other:   History of Present Illness  Rosa Herrera is a 45 year old female who presents with a ganglion cyst on her right wrist.    She noticed a lump at the base of her right thumb approximately one month ago, although it may have been present longer as she often wears bracelets. The lump is asymptomatic, causing no discomfort or pain. It is likely a benign ganglion cyst, which may fluctuate in size with hand activity such as gripping or carrying, and could potentially burst with pressure but would refill over time. She is not seeking surgical removal as it does not bother her.    She inquires about premenopausal symptoms and whether there are tests to determine her menopausal status. She currently has an IUD, which is due for renewal next year, and is curious about its impact on menopause. She wants a natural progression of her hormonal changes.    She discusses her general health and dietary habits, expressing interest in whether she should be taking specific vitamins as she ages. She mentions experiencing bloating after eating and acknowledges that she has not had a physical exam since 2022.   Chief Complaint Reviewed and Verified  Nursing Notes Reviewed and   Verified  Allergies Reviewed  Medications Reviewed  Problem List   Reviewed  OB Status Reviewed         Tobacco:  She has never smoked tobacco.    Current Medications[1]           Review of Systems:  Review of Systems       Objective:   BP 98/62   Pulse 68   Temp 97.8 °F (36.6 °C) (Temporal)   Resp 18   Ht 5' 4\" (1.626 m)   Wt 131 lb (59.4 kg)   SpO2 98%   BMI 22.49 kg/m²  Estimated body mass index is 22.49 kg/m² as calculated from the following:    Height as of this encounter: 5' 4\" (1.626 m).    Weight as of this encounter: 131 lb (59.4 kg).  Physical Exam  Musculoskeletal:      Right wrist:  Tenderness present.      Comments: Right wrist dorsum radial side palpable cyst.  Soft, non tender one to one and half cm.         Results         Physical Exam          Assessment & Plan:   Assessment & Plan  Ganglion cyst of joint of finger of right hand            Assessment & Plan  Ganglion cyst  Asymptomatic ganglion cyst at the base of the right thumb, benign with no malignancy risk. Surgical removal is definitive but unnecessary unless bothersome.  - Monitor for changes in size or symptoms.  - Consider referral to a hand surgeon if bothersome or if removal is desired.    Perimenopausal symptoms  Inquired about perimenopausal status. Prefers natural menopause progression without IUD interference. Discussed IUD impact on menopause with a gynecologist.  - Discuss IUD renewal and its impact on menopause with a gynecologist.    General Health Maintenance  Concerned about aging and necessary vitamins and tests. Emphasized whole foods diet. Multivitamin sufficient unless specific issues arise. Last physical exam in 2022; annual exams recommended after age 40.  - Schedule an annual physical exam with laboratory testing for liver and heart function, cholesterol, and other relevant parameters.  - Encourage a diet focusing on whole foods and less processed foods.  - Consider a multivitamin as a general supplement.        No follow-ups on file.        Addison Hoffman DO, 7/7/2025, 7:50 PM           [1]   Current Outpatient Medications   Medication Sig Dispense Refill    tretinoin 0.025 % External Cream Apply 1 Application topically nightly. Use as tolerated 30 g 3    Multiple Vitamin (MULTIVITAMIN OR) Take by mouth.      Cholecalciferol (VITAMIN D) 2000 units Oral Tab Take by mouth.      cephalexin 500 MG Oral Cap Take 1 capsule (500 mg total) by mouth 2 (two) times daily. (Patient not taking: Reported on 7/7/2025) 14 capsule 0

## (undated) DIAGNOSIS — R92.8 ABNORMAL MAMMOGRAM: Primary | ICD-10-CM

## (undated) NOTE — LETTER
4/17/2018              Rosa Herrera        [address] [facility] [address]         To Whom it may concern: This is to certify that Conor Bruner had an appointment on 4/17/2018 at 6:57 PM with Sheila Garcia DO.   Evaluation of your low b

## (undated) NOTE — LETTER
3/16/2021              Rosa Herrera        [address] [facility]         Dear Kirsten Mancia records indicate that the tests ordered for you by Doug Kilpatrick, DO  have not been done.   If you have, in fact, already completed the t

## (undated) NOTE — Clinical Note
5/11/2017          To Whom It May Concern:    Chi Bhat is currently under my medical care. Please excuse the patient from work missed ass he has been ill with a stomach flu. May return to work when well and sufficiently recovered.     If you require

## (undated) NOTE — LETTER
4/18/2019              Rosa Herrera        [address] [facility]         Dear Brett Swartzo,      It was a pleasure to see you at our [address] [place] [address] office.   Normal pap and negative HPV.  But

## (undated) NOTE — LETTER
AUTHORIZATION FOR SURGICAL OPERATION OR OTHER PROCEDURE    1. I hereby authorize Dr. Vianey Barker, and [facility] staff assigned to my case to perform the following operation and/or procedure at the [facility]:         IUD Exchange    2.   My physicia Relationship to Patient:           []  Parent    Responsible person                          []  Spouse  In case of minor or                    [] Other  _____________   Incompetent name:  __________________________________________________

## (undated) NOTE — LETTER
9/1/2020              Rosa Herrera        [address] [facility]         Dear Sonny Liao,    It was a pleasure to see you. Your mammogram was normal.  There is no need for further testing at this time.   I look forward to seeing you at

## (undated) NOTE — LETTER
5/21/2019              Rosa Herrera        [address] [facility]         Dear Yosi Castro,      It was a pleasure to see you at our [address] [place] [address], [facility] office.  Normal mammogram.  There is no need

## (undated) NOTE — LETTER
9/3/2020              Rosa Herrera        [address] [facility] [address]         Dear Erickson Mercado,      Your mammogram was negative / normal. The next one is due in one year.  I encourage you to still do monthly self breast exam (best time i

## (undated) NOTE — LETTER
March 6, 2021     [facility] [address]  [address]      Dear Sean Reyes:    Below are the results from your recent visit:    Resulted Orders   CBC, NO DIFFERENTIAL/PLATELET   Result Value Ref Range    WHITE BLOOD CELL COUNT 5.2 3.8 - 10